# Patient Record
Sex: FEMALE | Race: WHITE | NOT HISPANIC OR LATINO | Employment: UNEMPLOYED | URBAN - METROPOLITAN AREA
[De-identification: names, ages, dates, MRNs, and addresses within clinical notes are randomized per-mention and may not be internally consistent; named-entity substitution may affect disease eponyms.]

---
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
## 2023-01-17 ENCOUNTER — TELEPHONE (OUTPATIENT)
Dept: OBGYN CLINIC | Facility: CLINIC | Age: 18
End: 2023-01-17

## 2023-01-17 NOTE — TELEPHONE ENCOUNTER
Pt's mother, Robert Larkin would like to speak with you re her 21 yr old daughter - Crohns, anemia, menstrual issues

## 2023-03-21 ENCOUNTER — TELEPHONE (OUTPATIENT)
Dept: GASTROENTEROLOGY | Facility: CLINIC | Age: 18
End: 2023-03-21

## 2023-03-21 NOTE — TELEPHONE ENCOUNTER
Left voicemail and requested call back     Informed 6/5/23 Dr Vipul Hong not available at 9:40am but can offer later morning that day

## 2023-03-23 NOTE — TELEPHONE ENCOUNTER
Pt's mom [place] called back to reschedule  Nothing available on 6/5/23   She can be reached at [phone]

## 2023-05-01 ENCOUNTER — TELEPHONE (OUTPATIENT)
Dept: GASTROENTEROLOGY | Facility: CLINIC | Age: 18
End: 2023-05-01

## 2023-05-01 NOTE — TELEPHONE ENCOUNTER
Rec'd a call from Chelsey Ramos at [facility] ( [phone])  She confirmed that the patient is moving care to [place] and is interested in [place]  I requested up to date labs be faxed to the office and informed her that her appointment with Dr Rachel Bentley is on 5/9

## 2023-05-09 ENCOUNTER — TELEPHONE (OUTPATIENT)
Dept: GASTROENTEROLOGY | Facility: CLINIC | Age: 18
End: 2023-05-09

## 2023-05-09 ENCOUNTER — TELEPHONE (OUTPATIENT)
Dept: OTHER | Facility: OTHER | Age: 18
End: 2023-05-09

## 2023-05-09 ENCOUNTER — TELEMEDICINE (OUTPATIENT)
Dept: GASTROENTEROLOGY | Facility: CLINIC | Age: 18
End: 2023-05-09

## 2023-05-09 DIAGNOSIS — R10.9 ABDOMINAL PAIN, UNSPECIFIED ABDOMINAL LOCATION: ICD-10-CM

## 2023-05-09 DIAGNOSIS — R14.0 BLOATING: ICD-10-CM

## 2023-05-09 DIAGNOSIS — K50.012 CROHN'S DISEASE OF SMALL INTESTINE WITH INTESTINAL OBSTRUCTION (HCC): Primary | ICD-10-CM

## 2023-05-09 RX ORDER — BUDESONIDE 3 MG/1
CAPSULE, COATED PELLETS ORAL
COMMUNITY
Start: 2023-04-16

## 2023-05-09 RX ORDER — METHOTREXATE 15 MG/.4ML
INJECTION, SOLUTION SUBCUTANEOUS
COMMUNITY

## 2023-05-09 RX ORDER — OMEGA-3/DHA/EPA/FISH OIL 300-1000MG
CAPSULE ORAL DAILY
COMMUNITY

## 2023-05-09 RX ORDER — PANTOPRAZOLE SODIUM 40 MG/1
TABLET, DELAYED RELEASE ORAL
COMMUNITY
Start: 2023-03-12

## 2023-05-09 RX ORDER — SOD SULF/POT CHLORIDE/MAG SULF 1.479 G
TABLET ORAL
COMMUNITY
Start: 2023-03-08

## 2023-05-09 RX ORDER — PANTOPRAZOLE SODIUM 40 MG/1
40 TABLET, DELAYED RELEASE ORAL DAILY
COMMUNITY
Start: 2023-03-07 | End: 2023-06-05

## 2023-05-09 NOTE — TELEPHONE ENCOUNTER
Called the patient LMOM to call the office back  I left a message  On her answering machine and I left our fax# to my office, because I did not see any medical records scanned into the chart recently

## 2023-05-09 NOTE — TELEPHONE ENCOUNTER
Mom is requesting a call back to verify that office received all outside lab's and imaging prior to 5/9/2023 appointment  Please call back to address

## 2023-05-09 NOTE — PROGRESS NOTES
Virtual Regular Visit    Verification of patient location:    Patient is located at Home in the following state in which I hold an active license [place]      Assessment/Plan:    The patient is a 40-year-old woman with Crohn's disease (onset in 2013 when she presented with abdominal pain and CT with TI involvement and obstruction) multiple episodes of SBO (July 2022 and November 2022) who presents to [facility] care  Notably she was maintained on oral Pentasa for several years doing well until 2019 when she was hospitalized in [place] with partial small bowel obstruction and CT scan with stricture  At that time she had a colonoscopy and was started on subcutaneous weekly methotrexate and her Pentasa was discontinued  She was on methotrexate for the majority of 2020 and then underwent a reevaluation with an MR enterography that showed a very small 3 cm terminal ileal stricture and only mild elevation of her calprotectin  At that time methotrexate was discontinued and she was started on oral Entocort in September 2020  She had a likely flare in August and then another hospitalization in November for partial small bowel obstruction  Most recent colonoscopy reveals mucosal inflammatory changes secondary to Crohn's disease with ileitis but no evidence of stricture noted  There was inflammation with ulceration  Her MR enterography from March 27, 2023 showed a 1 2 cm inflammatory stricture without any upstream dilation  Colonoscopy from March 20, 2023 with fair prep, simple endoscopic score for Crohn's disease 9 with ileitis, 4 mm polyp suggestive of hyperplastic polyp in the sigmoid colon; biopsies of the terminal ileum normal, random colon biopsies normal, colon polyp with fecal material     Most recent calprotectin 153  CMP normal   Ferritin 281 with iron of 54 and iron saturation of 19  CRP 3 7  Normal white blood cell count, hemoglobin, MCV, platelets    Prior hepatitis B surface antibody reactive and surface antigen nonreactive  Prior vitamin D 36 7  At this time given her recurrent partial small bowel obstructions and active disease seen on imaging we will proceed with a biologic for moderately active Crohn's disease of the small bowel  Problem List Items Addressed This Visit        Other    Crohn's disease ([address] ) - Primary    Relevant Medications    budesonide (ENTOCORT EC) 3 MG capsule    Methotrexate, PF, (Otrexup) 15 MG/0 4ML SOAJ   Other Visit Diagnoses     Bloating        Abdominal pain, unspecified abdominal location              We discussed the risks and benefits of different treatment options including adalimumab versus Stelara versus Skyrizi  We will proceed with Stelara  Next blood work and calprotectin TBD  Next colonoscopy TBD  Next MR enterography potentially in 9 to 12 months  No indication for surveillance colonoscopies given absence of colonic Crohn's disease    IBD HCM when on biologic:  Avoid live virus vaccines  Yearly flu shot  COVID vaccine and booster  Pneumonia vaccine  Shingrix  Routine Pap smears as per national guidelines  Routine skin exams with the dermatologist  DEXGAVIN in the future         Reason for visit is   Chief Complaint   Patient presents with   • Virtual Regular Visit   • Virtual Regular Visit        Encounter provider Alaina Jara MD    Provider located at [address] [place] [id number] [phone]      Recent Visits  No visits were found meeting these conditions  Showing recent visits within past 7 days and meeting all other requirements  Today's Visits  Date Type Provider Dept   05/09/23 Telephone Alaina Jara MD [facility]   05/09/23 Telemedicine Alaina Jara MD [facility]   Showing today's visits and meeting all other requirements  Future Appointments  No visits were found meeting these conditions    Showing future appointments within next 150 days and meeting all other requirements       The patient was identified by name and date of birth  Brittaney Rivera was informed that this is a telemedicine visit and that the visit is being conducted through the [employer]  She agrees to proceed     My office door was closed  No one else was in the room  She acknowledged consent and understanding of privacy and security of the video platform  The patient has agreed to participate and understands they can discontinue the visit at any time  Patient is aware this is a billable service  Referred by Dr Sheryle Manger and Dr Kenyetta Leiva for Crohn's    Subjective  Brittaney Rivera is a 21 y o  female with Crohns diagnosed at age 3  She was on pentasa for 5 years  Milk was a trigger  She went to [place] and had a pSBO  She was on budesonide to calm things and she did Methotrexate but it did nto work well  After that she was sensitive to gluten and some other foods  It made her body sensitive  She has been managing things with diet  She avoids certain foods to manage it that way  1 episode in [place] and she was in the hospital  She was doing 80:20 liquid diet  August she was in 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  She has been on a budesonide taper  Now on 3mg  She saw a GI physician there and had MRE, colonoscopy and blood work  In the past 2 weeks normal stools  She will be more constipated than not  Today she feels bloated  Some days like that  She might notice more distention and she will drink more liquids  1 BMs per day, + formed  No bright red blood per rectum/rectal bleeding, no melena  If bloated and constipated she will feel pain  No rashes, no mouth sores, no joint pains  + tiredness  She had iron infusions last summer     She does have occasional feeling of fullness  She was first diagnosed with gastroparesis  In 2-3 weeks she had to take a Tums twice  No heartburn, dysphagia, odynophagia  Occasional mild nausea but no vomiting  When the Crohn's is active she will have nausea  Over the Winter when on the budesonide she could not sleep  She has been on pantoprazole  Sometimes she is hungry but feels like she can not eat  No recent weight loss (but she lost 10 lbs in the Winter)  She had irregular periods but it got better  Her feet are cold  They saw endocrinology, vascular  Past Medical History:   Diagnosis Date   • Abnormal weight loss     last assessed: 1/15/15   • Anemia     resolved: 12/10/15   • Asthma    • Calcaneal apophysitis     left; lasts assessed: 2/17/14   • [facility] disease [facility] )    • Mass of breast, left     last assessed: 6/9/15   • Unspecified subluxation of left patella, initial encounter     last assessed: 2/2/16   • Vitamin D deficiency        Past Surgical History:   Procedure Laterality Date   • COLONOSCOPY     • ESOPHAGOGASTRODUODENOSCOPY     • PORT WINE STAIN REMOVAL W/ LASER         Current Outpatient Medications   Medication Sig Dispense Refill   • pantoprazole (PROTONIX) 40 mg tablet Take 40 mg by mouth daily     • ascorbic acid (VITAMIN C) 250 mg tablet Take 500 mg by mouth daily     • b complex vitamins capsule Take 1 capsule by mouth daily     • budesonide (ENTOCORT EC) 3 MG capsule TAKE 2 CAPSULES (6 MG TOTAL) BY MOUTH 1 (ONE) TIME EACH DAY IN THE MORNING       • Cholecalciferol (VITAMIN D3) 2000 units capsule Take 1 capsule by mouth daily Take 3000 iu daily      • DIGESTIVE ENZYMES PO Take by mouth     • fish oil-omega-3 fatty acids 1000 MG capsule Take by mouth daily     • Methotrexate, PF, (Otrexup) 15 MG/0 4ML SOAJ INJECT 1 PEN UNDER THE SKIN ONCE A WEEK (SATURDAY) for 28     • Omega-3 Fatty Acids (Fish Oil) 1000 MG CPDR Take by mouth     • ondansetron (ZOFRAN-ODT) 4 mg disintegrating tablet Take 1 tablet (4 mg total) by mouth every 6 (six) hours as needed for nausea or vomiting 20 tablet 0   • pantoprazole (PROTONIX) 40 mg tablet TAKE 1 TABLET BY MOUTH EVERY DAY **DO NOT CRUSH, CHEW, OR SPLIT**     • Probiotic Product (Misc Intestinal Aga Regulat) CAPS Take by mouth daily     • Probiotic Product (PROBIOTIC-10 PO) Take by mouth     • Sutab 1385-147-657 MG TABS TAKE 24 TABLETS BY MOUTH SEE ADMINISTRATION INSTRUCTIONS  No current facility-administered medications for this visit  Allergies   Allergen Reactions   • Gluten Meal - Food Allergy    • Seasonal Ic [Cholestatin]    • Tilactase        REVIEW OF SYSTEMS:  10 point ROS reviewed and negative, except as above        PHYSICAL EXAMINATION:  Appearance and vitals taken from home devices    General Appearance:   Alert, cooperative, no distress   HEENT:  Normocephalic, atraumatic, anicteric  Neck supple, symmetrical, trachea midline  Lungs:   Equal chest rise and unlabored breathing, normal effort, no coughing  Cardiovascular:   No visualized JVD  Abdomen:   No abdominal distension  Skin:   No jaundice, rashes, or lesions  Musculoskeletal:   Normal range of motion visualized  Psych:  Normal affect and normal insight  Neuro:  Alert and appropriate           Visit Time  Total Visit Duration: 40

## 2023-05-10 NOTE — PATIENT INSTRUCTIONS
I called patient to let her know that I put a recall in Epic for a 3-4 month follow up in Epic,for the nayla office,  and I let her know I will be sending her sent patient AVS in the mail   She expressed understanding

## 2023-05-11 NOTE — TELEPHONE ENCOUNTER
5/11 called [phone] prior auth started, [id number], clinicals faxed to [phone]

## 2023-05-12 NOTE — TELEPHONE ENCOUNTER
Pts mom is calling to speak with Junior Griffin regarding [employer]  She would like to get this medication started as soon as possible  Informed her that prior authorization was started  She was appreciative but would still like to speak with Rosalba on Monday

## 2023-05-15 NOTE — TELEPHONE ENCOUNTER
Connected with [place] via phone  We spoke of the prior authorization process and Skyrizi  Along with an approximate timeline of PA determination and infusion appointment  Tony Le wanted to know if she should call insurance to help the process  I assured her that our Authorization Specialist will take care of this and reach out once a determination is made

## 2023-05-18 NOTE — TELEPHONE ENCOUNTER
Called Nikhil at [phone]  Reviewed dose is 600mg IV monthly for 3 doses  She approved one dose automatically for us  The remaining two doses will be sent to medical director under a new case and he may deny for site of care  Jf Richardson will call me back with that new pending case number        1 dose approved  5/12/2023 through 6/12/2023  Auth #: [id number]

## 2023-05-18 NOTE — TELEPHONE ENCOUNTER
Called insurance at [phone] and spoke with Daniel Gaston  She stated this is pending and will be going to medical director  She stated it will most likely get denied but we can than move forward with a peer to peer if we would like

## 2023-05-18 NOTE — TELEPHONE ENCOUNTER
Patients GI provider:  Dr Kumar Lopez    Number to return call: [phone]    Reason for call: Liya Rowan from [facility] calling wanting the dosage and frequency of the medication Loc Rowan may be reached at the above number      Scheduled procedure/appointment date if applicable: N/A

## 2023-05-31 ENCOUNTER — TELEPHONE (OUTPATIENT)
Dept: GASTROENTEROLOGY | Facility: CLINIC | Age: 18
End: 2023-05-31

## 2023-05-31 NOTE — TELEPHONE ENCOUNTER
Brook Mancilla called in to inform that secondary insurance has [facility] but primary insurance has approved and is requesting a peer to peer  [employer] Ph# [phone]  Case # [id number]    Must advise that it's a medical buy & build  Brook Mancilla is requesting a return phone call once the office has spoken with the insurance company

## 2023-06-01 NOTE — TELEPHONE ENCOUNTER
Connected with the mother via phone  Mother expressed frustration with insurance company and inconsistent information  I relayed the recent communications within this thread to her  She is pleased that it is approved through secondary and we are waiting for [facility] to get documentation of the approval so they can schedule Harry  The mother expressed concern about not having the Skyrizi SQ approved before starting the IV loading  I explained that [place] will have a higher likelihood of approval with the SQ portion of Skyrizi if she is successful with the IV portion and traditionally we will wait to get the SQ portion authorized for this reason

## 2023-06-01 NOTE — TELEPHONE ENCOUNTER
6/1 called number listed, after 3 transfers I was told that it is approved but Al Garcia is working on it so I cant get the info yet  She will fax the auth to us   I called [facility] and spoke to [facility] and she will work on getting pt scheduled ASAP

## 2023-06-01 NOTE — TELEPHONE ENCOUNTER
Sherron ozuna from [employer] in regards to [employer]  She states urgent request for Yousuf Breaux has been approved and that it does not need to go through an appeal process  Sherron Alex can be reached at [phone] regarding this matter

## 2023-06-02 NOTE — TELEPHONE ENCOUNTER
6/2 spoke with Hyacinth Chavez [phone], she still hasnt gotten the approval fax from [address] [place] [address] yet, but they will not hold up treatment, they will be calling pt to schedule infusions  I sent pt a "[employer] message telling her they will be calling to schedule, also gave her 2 numbers to schedule herself  [phone] or [phone]

## 2023-06-02 NOTE — TELEPHONE ENCOUNTER
6/2 spoke to [facility] at [facility], she hasnt gotten anything confirming [facility] has approved the skyrizi, she is going to call them to verify for herself and call me back   [phone]

## 2023-06-09 ENCOUNTER — TELEPHONE (OUTPATIENT)
Dept: OTHER | Facility: OTHER | Age: 18
End: 2023-06-09

## 2023-06-09 NOTE — TELEPHONE ENCOUNTER
Connected with the mother via phone  She has not received communication from [facility] about setting up the patient's infusions  I said that I will call my contact there and give her a call right back

## 2023-06-09 NOTE — TELEPHONE ENCOUNTER
Connected with the mother via phone and relayed my communication with [facility]  She expressed her displeasure with this entire process with [facility] and how the insurance company is forcing [place] outside the hospital  She said that she has left [employer] and another employee at [facility] a message and didn't hear back from them  She also called nursing at [facility] 2 weeks ago with questions about their protocols and hasn't heard back  She is concerned with a reaction and how the nurses are trained  She said that if she needs to have [place] come to  to have a peripheral line placed temporarily then she will and then [facility] can access it  The mother added that if they don't give her the right answers then she may not get the infusion at [facility]

## 2023-06-09 NOTE — TELEPHONE ENCOUNTER
Per Vilma from [facility]: We received approval at 10:39 this morning from [facility]  We have been having trouble with her secondary insurance  I will call and have this expedited  I explained that the mother would like a call back immediately as she has been calling and hasn't received a phone call back

## 2023-06-09 NOTE — TELEPHONE ENCOUNTER
Patient's mother would appreciate a callback from [facility] in [employer] office

## 2023-06-09 NOTE — TELEPHONE ENCOUNTER
6/9 still awaiting approval from [facility], when I spoke to Dandre Bledsoe on 6/5 when she called asking if I got the approval letter, she stated there were documented messages in pts chart that they had reached out to the patient to schedule

## 2023-06-23 ENCOUNTER — TELEPHONE (OUTPATIENT)
Dept: GASTROENTEROLOGY | Facility: CLINIC | Age: 18
End: 2023-06-23

## 2023-06-26 ENCOUNTER — TELEPHONE (OUTPATIENT)
Dept: FAMILY MEDICINE CLINIC | Facility: MEDICAL CENTER | Age: 18
End: 2023-06-26

## 2023-06-26 DIAGNOSIS — M25.561 RIGHT KNEE PAIN, UNSPECIFIED CHRONICITY: Primary | ICD-10-CM

## 2023-06-26 NOTE — TELEPHONE ENCOUNTER
Spoke with patient, mom is not listed on the most recent communication form  Patient was kicked in the right knee by a horse back in December  This happened in [place], imagining did not show any fractures  She would like a referral for PT for R knee

## 2023-07-06 NOTE — PROGRESS NOTES
PT Evaluation     Today's date: 7/10/2023  Patient name: Grayland Denver  : 2005  MRN: [id number]  Referring provider: Self, Referral  Dx:   Encounter Diagnosis     ICD-10-CM    1. Chronic pain of right knee  M25.561     G89.29       2. Right hip pain  M25.551       3. Chronic midline low back pain without sciatica  M54.50     G89.29           Start Time: 0900  Stop Time: 0945  Total time in clinic (min): 45 minutes    Assessment  Assessment details: Grayland Denver is a 21 y.o. female who presents with signs and symptoms consistent of waxing/waning lower back pain, R hip pain, and R lateral knee pain. 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 Patient presents with centralized lower back pain, right lateral hip pain, and right lateral knee pain. She demonstrates decreased L/S joint mobility, R hip joint restrictions, LE flexibility restrictions, weakness in lower abdominal region, and decreased motor control of hips. Due to these impairments, Patient has difficulty performing daily jobs duties and riding without discomfort. Patient would benefit from skilled physical therapy to address the impairments, improve their level of function, and to improve their overall quality of life.     Primary movement impairments:   1) Lower L/S joint mobility restrictions  2) Poor motor control of hip and lower abdominal region  3) R hip joint mobility restrictions  4) Flexibility restrictions in gastroc/soleus, hamstrings, and R quadriceps     Impairments: abnormal muscle firing, abnormal muscle tone, abnormal or restricted ROM, activity intolerance, impaired physical strength, lacks appropriate home exercise program and pain with function  Understanding of Dx/Px/POC: good   Prognosis: good    Goals  Short Term Goals: to be achieved by 4 weeks  1) Patient to be independent with basic HEP.  2) Decrease pain to mild at its worst.  3) Increase joint mobility in L/S to WNL   4) Increase (hip) LE strength by 1/2 MMT grade in all deficient planes. Long Term Goals: to be achieved by discharge  1) FOTO equal to or greater than expected. 2) Pt to be able to ride horses with manageable discomfort   3) Improve SL squat mechanics to symmetrical without compensated trendelenburg  4) Pt to demonstrate negative DEVEN/FADIR tests of the R hip   4) Pt to improved hamstring flexibility by 5-10 degrees bilaterally. Plan  Patient would benefit from: PT eval and skilled physical therapy  Planned therapy interventions: joint mobilization, manual therapy, patient education, neuromuscular re-education, therapeutic activities, therapeutic exercise and home exercise program  Frequency: 1x per week for 6-8 weeks. Treatment plan discussed with: patient        Subjective Evaluation    History of Present Illness  Mechanism of injury: History of Current Injury: Pt referred to PT due to R knee pain after receiving a kick to the knee in December from a horse she was working with in [place]. Pt also complains of pain at her right hip and lumbar spine. Pt reports most discomfort right now is her lower back. Pt denies any inciting injury but was treated for this condition by me last year. More recently, lower back has been aching more. 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 Pt has been very busy with working the barn. She doesn't have any days off. Pt has a medical history of Crohn's disease. She just started with [employer]. Pain location/Descriptors: P1: centralized LBP (ache). P2: Sharp lateral right hip pain. P3: lateral right knee pain. Aggravating factors: More symptoms when off her horse and after a long day. Often increases with excessive riding. May increase with jerky movements when riding her horses. Easing factors: Massage therapist (Every 2 weeks).   24 HR pattern: Depending on activity level. Imaging: XR of knee- No acute pathology (December in 2022) while in [place]. Special Questions: Pt denies any N&T, Denies B&B abnormalities, Saddle anesthesia. Pt denies any buckling or giving way of her right knee. 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 Pain scale at highest: Moderate. Diagnostic Tests  X-ray: normal  Treatments  Previous treatment: chiropractic, massage and physical therapy  Current treatment: chiropractic and massage  Patient Goals  Patient goals for therapy: increased strength, increased motion, decreased pain and independence with ADLs/IADLs          Objective     Active Range of Motion     Lumbar   Flexion: 110 degrees   Extension: 35 degrees  with pain  Left lateral flexion: 30 degrees    with pain  Right lateral flexion: 40 degrees   Left rotation:  Restriction level: moderate  Right rotation:  Restriction level: moderate    Additional Active Range of Motion Details  L quadrant: pain on opp  R quadrant: pain on opp   Side glide: Pinching discomfort bilaterally   *Pain with end range extension  *Pain with R SB     Joint Play   Joints within functional limits: L1, L2 and L3     Hypomobile: L4, L5 and S1     Strength/Myotome Testing     Lumbar   Left   Heel walk: normal  Toe walk: normal    Right   Heel walk: normal  Toe walk: normal    Left Hip   Planes of Motion   Flexion: 4-  Extension: 4-  Abduction: 3+  External rotation: 4-    Right Hip   Planes of Motion   Flexion: 4-  Extension: 4-  Abduction: 3+  External rotation: 4-    Left Knee   Flexion: 5  Extension: 5    Right Knee   Flexion: 5  Extension: 5    Left Ankle/Foot   Dorsiflexion: 5  Plantar flexion: 5  Great toe extension: 5    Right Ankle/Foot   Dorsiflexion: 5  Plantar flexion: 5  Great toe extension: 5    Tests     Lumbar     Left   Positive quadrant.    Negative crossed SLR and passive SLR. Right   Positive quadrant. Negative crossed SLR and passive SLR. Left Pelvic Girdle/Sacrum   Negative: thigh thrust.     Left Hip   Negative DEVEN and FADIR. Right Hip   Positive DEVEN and FADIR.      Additional Tests Details  *Difficulty activating lower abdominal region  Functional squat: Shift to the R   SL squat: compensated trendelenburg more  On L vs R. Valgus bilaterally     Hamstring length:   R:75 deg (Max)  L: 80 deg (Max)     Mild restriction in R Quad compared to L   Mod restriction in Gastroc/soleus    +DEVEN/FADIR on R for hip pain  Hip ER @ 90: 75 deg R and 90 L    *Hypomobile CPA at lower L/S worse L than R             Precautions: Vitamin D deficiency, Crohn's Disease      Manuals             CPA of lower lumbar spine nv                                                   Neuro Re-Ed             Sidelying plank with hip abduction             6 " holds in neutral             SLB with PB hip rotations             90/90 hooklying with hip flexor activation                                                    Ther Ex             Ellipitcal              Hip IR/ER in long sitting             Hamstring stretch on step                                                                               Ther Activity             Kneeling press c/ hip rotations             Prone plank with hip extension             Gait Training                                       Modalities                                         Pt was given a HEP with verbal and written instruction: ham stretch, calf stretch, side plank c/ hip abd, WB ER stretch

## 2023-07-10 ENCOUNTER — OFFICE VISIT (OUTPATIENT)
Dept: PHYSICAL THERAPY | Facility: CLINIC | Age: 18
End: 2023-07-10
Payer: COMMERCIAL

## 2023-07-10 DIAGNOSIS — M25.551 RIGHT HIP PAIN: ICD-10-CM

## 2023-07-10 DIAGNOSIS — M25.561 CHRONIC PAIN OF RIGHT KNEE: Primary | ICD-10-CM

## 2023-07-10 DIAGNOSIS — M54.50 CHRONIC MIDLINE LOW BACK PAIN WITHOUT SCIATICA: ICD-10-CM

## 2023-07-10 DIAGNOSIS — G89.29 CHRONIC MIDLINE LOW BACK PAIN WITHOUT SCIATICA: ICD-10-CM

## 2023-07-10 DIAGNOSIS — G89.29 CHRONIC PAIN OF RIGHT KNEE: Primary | ICD-10-CM

## 2023-07-10 PROCEDURE — 97162 PT EVAL MOD COMPLEX 30 MIN: CPT | Performed by: PHYSICAL THERAPIST

## 2023-07-10 PROCEDURE — 97110 THERAPEUTIC EXERCISES: CPT | Performed by: PHYSICAL THERAPIST

## 2023-07-11 DIAGNOSIS — R11.0 NAUSEA: ICD-10-CM

## 2023-07-11 RX ORDER — ONDANSETRON 4 MG/1
4 TABLET, ORALLY DISINTEGRATING ORAL EVERY 6 HOURS PRN
Qty: 20 TABLET | Refills: 0 | Status: SHIPPED | OUTPATIENT
Start: 2023-07-11

## 2023-07-17 ENCOUNTER — TELEPHONE (OUTPATIENT)
Dept: FAMILY MEDICINE CLINIC | Facility: MEDICAL CENTER | Age: 18
End: 2023-07-17

## 2023-07-17 ENCOUNTER — NURSE TRIAGE (OUTPATIENT)
Age: 18
End: 2023-07-17

## 2023-07-17 ENCOUNTER — APPOINTMENT (OUTPATIENT)
Dept: PHYSICAL THERAPY | Facility: CLINIC | Age: 18
End: 2023-07-17
Payer: COMMERCIAL

## 2023-07-17 DIAGNOSIS — K50.012 CROHN'S DISEASE OF SMALL INTESTINE WITH INTESTINAL OBSTRUCTION (HCC): Primary | ICD-10-CM

## 2023-07-17 DIAGNOSIS — J02.9 SORE THROAT: ICD-10-CM

## 2023-07-17 RX ORDER — DIPHENHYDRAMINE HYDROCHLORIDE AND LIDOCAINE HYDROCHLORIDE AND ALUMINUM HYDROXIDE AND MAGNESIUM HYDRO
10 KIT EVERY 4 HOURS PRN
Qty: 119 ML | Refills: 0 | Status: SHIPPED | OUTPATIENT
Start: 2023-07-17

## 2023-07-17 NOTE — TELEPHONE ENCOUNTER
----- Message from Ever Rankin [facility]) [employer] sent at 7/17/2023  4:47 PM EDT -----  Pt stated that she spoke with Rosalba this morning, and she's waiting for a call back, but she did not receive a call back as yet.  7.17.23

## 2023-07-17 NOTE — TELEPHONE ENCOUNTER
I called and spoke to [place]. Visit with PCP scheduled for tomorrow. No fever. Sore throat, congestion. 1 cough earlier today. Recommended to see PCP for rapid strep test and culture, as well as additional work-up. Sent Magic Mouthwash (and will do it pro-actively).

## 2023-07-17 NOTE — TELEPHONE ENCOUNTER
Pt's mother called. Pt had her 2nd skyrizzi injection a week ago. On the 12th, pt started with a sore throat, no fever, congestion. She has been using decongestant and flonase. Mother called gi and is waiting for a call back. Sore throat is not necessarily a reaction to skyrizzi but cold symptoms are. Pt is immunosuppressed. Please, advise.

## 2023-07-17 NOTE — TELEPHONE ENCOUNTER
Patients mother Sulema Nicole calling in to speak with Rosalba.  Please call Sulema Nicole at [phone]

## 2023-07-17 NOTE — TELEPHONE ENCOUNTER
Added onto the encounter on 7/17 at 11:05am:    Crow Doug complained of waiting on hold for 45+ minutes. I provided alternate means to communicate with the office such as [employer] messaging. Crow Doug mentions she doesn't know who she is going to speak to [facility], [facility], or the state about Harry's infusion experience. She expressed fault in infusion center's ([facility]) lack of organization, lack of cleanliness, lack of communication, and adds that if she wouldn't have called then [place] wouldn't have gotten her medication.     Crow Doug states, "I'm glad I'm educated because no one knows how to do their job."

## 2023-07-17 NOTE — TELEPHONE ENCOUNTER
Connected with the patient's mother Majo via phone. Patient had second Skyrizi infusion on 7/11. Patient started with cold-like symptoms on 7/12 and currently is experiencing the following:  -Sore throat  -Congestion  -Loosing voice  -Fatigue, mother is unsure if it's the patient's normal fatigue. Denies: fever, mouth sores  Patient has been taking Motrin, Claritin, Vitamin D, and Vitamin C. I encouraged Majo to notify the patient's PCP. Majo stated "they are going to want her to go the ER and I will not take her to the ER." I then suggested to set up a televisit through urgent care, since the PCP wouldn't be an option for the patient. I went on to say that they would be able to address cold-like symptoms to determine if they are viral or an infection. Majo then said, "I will call the PCP I think they could fit her in."     Livingston read the [employer] package insert and said that it didn't mention anything about a sore throat. I referenced that this medication is immunocompromising in nature therefore [facility] get sick more often than someone who isn't immunocompromised. Livingston mentioned if blood work is needed as [facility] usually has blood work every [place]. I said that I will ask Dr. Georgia Esposito. Dr. Georgia Esposito: Would you please advise. Thank you!

## 2023-07-18 ENCOUNTER — OFFICE VISIT (OUTPATIENT)
Dept: FAMILY MEDICINE CLINIC | Facility: MEDICAL CENTER | Age: 18
End: 2023-07-18
Payer: COMMERCIAL

## 2023-07-18 ENCOUNTER — TELEPHONE (OUTPATIENT)
Dept: FAMILY MEDICINE CLINIC | Facility: MEDICAL CENTER | Age: 18
End: 2023-07-18

## 2023-07-18 ENCOUNTER — APPOINTMENT (OUTPATIENT)
Dept: PHYSICAL THERAPY | Facility: CLINIC | Age: 18
End: 2023-07-18
Payer: COMMERCIAL

## 2023-07-18 VITALS
TEMPERATURE: 98 F | HEART RATE: 78 BPM | WEIGHT: 115.6 LBS | OXYGEN SATURATION: 99 % | BODY MASS INDEX: 18.58 KG/M2 | HEIGHT: 66 IN

## 2023-07-18 DIAGNOSIS — J02.9 SORE THROAT: Primary | ICD-10-CM

## 2023-07-18 DIAGNOSIS — R09.81 SINUS CONGESTION: ICD-10-CM

## 2023-07-18 LAB — S PYO AG THROAT QL: NEGATIVE

## 2023-07-18 PROCEDURE — 87636 SARSCOV2 & INF A&B AMP PRB: CPT | Performed by: STUDENT IN AN ORGANIZED HEALTH CARE EDUCATION/TRAINING PROGRAM

## 2023-07-18 PROCEDURE — 87070 CULTURE OTHR SPECIMN AEROBIC: CPT | Performed by: STUDENT IN AN ORGANIZED HEALTH CARE EDUCATION/TRAINING PROGRAM

## 2023-07-18 PROCEDURE — 99213 OFFICE O/P EST LOW 20 MIN: CPT | Performed by: STUDENT IN AN ORGANIZED HEALTH CARE EDUCATION/TRAINING PROGRAM

## 2023-07-18 PROCEDURE — 87880 STREP A ASSAY W/OPTIC: CPT | Performed by: STUDENT IN AN ORGANIZED HEALTH CARE EDUCATION/TRAINING PROGRAM

## 2023-07-18 NOTE — PROGRESS NOTES
[address]  [facility] Note  Holli Delcid [place], 21     Jam Saleh MRN: [id number] : 2005 Age: 21 y.o. Assessment/Plan     1. Sore throat    -Afebrile, vital signs stable  -Likely viral in etiology  -Continue supportive care measures  -Start Flonase  - POCT rapid strepA negative  - Throat culture; Future  - Covid/Flu- Office Collect  - Throat culture  -We will be in touch with throat culture results and COVID-19/influenza testing  -Patient advised about signs and symptoms in which case to contact sooner such as fever    2. Sinus congestion    - Covid/Flu- Office Collect      Jam Saleh acknowledged understanding of treatment plan, all questions answered. Subjective     Jam Saleh is a 21 y.o. female who presents for evaluation of sore throat. Patient states she started [place], had first infusion  and second infusion . Symptoms as far sore throat started . On , , symptoms worsened " super congested and throat worse, no fever, started to lose my voice". Today, " pressure in my head not like it was" and hoarseness, post nasal drip, sinus and nasal congestion and sore throat. Patient has been managing her symptoms with Magic mouthwash as prescribed by her GI and supportive care measures such as tea with honey. She is drinking plenty of fluids. She has not had a recent close exposure to someone with proven streptococcal pharyngitis.     The following portions of the patient's history were reviewed and updated as appropriate: allergies, current medications, past family history, past medical history, past social history, past surgical history and problem list.     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   • Crohn's disease ([address])    • Mass of breast, left     last assessed: 6/9/15   • Unspecified subluxation of left patella, initial encounter     last assessed: 2/2/16   • Vitamin D deficiency        Allergies   Allergen Reactions   • Gluten Meal - Food Allergy    • Seasonal Ic [Cholestatin]    • Tilactase        Past Surgical History:   Procedure Laterality Date   • COLONOSCOPY     • ESOPHAGOGASTRODUODENOSCOPY     • PORT WINE STAIN REMOVAL W/ LASER         Family History   Problem Relation Age of Onset   • Hypothyroidism Mother    • No Known Problems Father    • Cancer Family    • ROJELIO disease Family    • Allergies Family    • Hypothyroidism Maternal Grandmother        Social History     Socioeconomic History   • Marital status: Single     Spouse name: None   • Number of children: None   • Years of education: None   • Highest education level: None   Occupational History   • None   Tobacco Use   • Smoking status: Never   • Smokeless tobacco: Never   Substance and Sexual Activity   • Alcohol use: No   • Drug use: No   • Sexual activity: None   Other Topics Concern   • None   Social History Narrative    Lives with parents     Social Determinants of Health     Financial Resource Strain: Not on file   Food Insecurity: Not on file   Transportation Needs: Not on file   Physical Activity: Not on file   Stress: Not on file   Social Connections: Not on file   Intimate Partner Violence: Not on file   Housing Stability: Not on file       Current Outpatient Medications   Medication Sig Dispense Refill   • ascorbic acid (VITAMIN C) 250 mg tablet Take 500 mg by mouth daily     • Cholecalciferol (VITAMIN D3) 2000 units capsule Take 1 capsule by mouth daily Take 3000 iu daily      • diphenhydramine, lidocaine, Al/Mg hydroxide, simethicone (Magic Mouthwash) SUSP Swish and swallow 10 mL every 4 (four) hours as needed for mouth pain or discomfort 119 mL 0   • Risankizumab-rzaa (SKYRIZI IV) Inject into a catheter in a vein     • b complex vitamins capsule Take 1 capsule by mouth daily (Patient not taking: Reported on 7/18/2023)     • budesonide (ENTOCORT EC) 3 MG capsule TAKE 2 CAPSULES (6 MG TOTAL) BY MOUTH 1 (ONE) TIME EACH DAY IN THE MORNING. • DIGESTIVE ENZYMES PO Take by mouth     • fish oil-omega-3 fatty acids 1000 MG capsule Take by mouth daily     • Methotrexate, PF, (Otrexup) 15 MG/0.4ML SOAJ INJECT 1 PEN UNDER THE SKIN ONCE A WEEK (SATURDAY) for 28     • Omega-3 Fatty Acids (Fish Oil) 1000 MG CPDR Take by mouth     • ondansetron (ZOFRAN-ODT) 4 mg disintegrating tablet Take 1 tablet (4 mg total) by mouth every 6 (six) hours as needed for nausea or vomiting 20 tablet 0   • pantoprazole (PROTONIX) 40 mg tablet Take 40 mg by mouth daily     • pantoprazole (PROTONIX) 40 mg tablet TAKE 1 TABLET BY MOUTH EVERY DAY **DO NOT CRUSH, CHEW, OR SPLIT**     • Probiotic Product (Misc Intestinal Aga Regulat) CAPS Take by mouth daily     • Probiotic Product (PROBIOTIC-10 PO) Take by mouth     • Sutab 8327-379-378 MG TABS TAKE 24 TABLETS BY MOUTH SEE ADMINISTRATION INSTRUCTIONS. No current facility-administered medications for this visit. Review of Systems     As noted in HPI    Objective      Pulse 78   Temp 98 °F (36.7 °C)   Ht 5' 6.25" (1.683 m)   Wt 52.4 kg (115 lb 9.6 oz)   LMP 07/09/2023 (Exact Date)   SpO2 99%   BMI 18.52 kg/m²     Physical Exam  Vitals reviewed. Constitutional:       General: She is not in acute distress. Appearance: She is well-developed. She is not toxic-appearing. HENT:      Head: Normocephalic and atraumatic. Right Ear: Tympanic membrane and ear canal normal.      Left Ear: Tympanic membrane and ear canal normal.      Nose: Congestion and rhinorrhea present. Mouth/Throat:      Mouth: Mucous membranes are moist.      Pharynx: Uvula midline. No posterior oropharyngeal erythema. Tonsils: Tonsillar exudate present. No tonsillar abscesses. 1+ on the right. 1+ on the left.    Eyes:      Conjunctiva/sclera: Conjunctivae normal.      Pupils: Pupils are equal, round, and reactive to light. Cardiovascular:      Rate and Rhythm: Normal rate and regular rhythm. Heart sounds: Normal heart sounds. Pulmonary:      Effort: Pulmonary effort is normal. No respiratory distress. Breath sounds: Normal breath sounds. No wheezing. Musculoskeletal:      Cervical back: Neck supple. Lymphadenopathy:      Cervical: No cervical adenopathy. Skin:     General: Skin is warm and dry. Neurological:      Mental Status: She is alert and oriented to person, place, and time. Psychiatric:         Mood and Affect: Mood normal.         Behavior: Behavior normal.             Some portions of this record may have been generated with voice recognition software. There may be translation, syntax, or grammatical errors. Occasional wrong word or "sound-a-like" substitutions may have occurred due to the inherent limitations of the voice recognition software. Read the chart carefully and recognize, using context, where substations may have occurred. If you have any questions, please contact the dictating provider for clarification or correction, as needed.

## 2023-07-18 NOTE — TELEPHONE ENCOUNTER
Please watch for Covid/Flu and Throat Culture Results. Dr Vera Bruno is out for the remainder of the week.

## 2023-07-19 LAB
FLUAV RNA RESP QL NAA+PROBE: NEGATIVE
FLUBV RNA RESP QL NAA+PROBE: NEGATIVE
SARS-COV-2 RNA RESP QL NAA+PROBE: NEGATIVE

## 2023-07-20 LAB — BACTERIA THROAT CULT: NORMAL

## 2023-07-21 ENCOUNTER — OFFICE VISIT (OUTPATIENT)
Dept: PHYSICAL THERAPY | Facility: CLINIC | Age: 18
End: 2023-07-21
Payer: COMMERCIAL

## 2023-07-21 DIAGNOSIS — M25.561 CHRONIC PAIN OF RIGHT KNEE: Primary | ICD-10-CM

## 2023-07-21 DIAGNOSIS — G89.29 CHRONIC PAIN OF RIGHT KNEE: Primary | ICD-10-CM

## 2023-07-21 DIAGNOSIS — M25.551 RIGHT HIP PAIN: ICD-10-CM

## 2023-07-21 PROCEDURE — 97110 THERAPEUTIC EXERCISES: CPT | Performed by: PHYSICAL THERAPIST

## 2023-07-21 PROCEDURE — 97112 NEUROMUSCULAR REEDUCATION: CPT | Performed by: PHYSICAL THERAPIST

## 2023-07-21 PROCEDURE — 97140 MANUAL THERAPY 1/> REGIONS: CPT | Performed by: PHYSICAL THERAPIST

## 2023-07-21 NOTE — PROGRESS NOTES
Daily Note     Today's date: 2023  Patient name: Bernardino Ewing  : 2005  MRN: [id number]  Referring provider: Self, Referral  Dx:   Encounter Diagnosis     ICD-10-CM    1. Chronic pain of right knee  M25.561     G89.29       2. Right hip pain  M25.551           Start Time: 0730  Stop Time: 0815  Total time in clinic (min): 45 minutes    Subjective: Pt reports having massage and accupuncture recently which has helped. Objective: See treatment diary below      Assessment: Treatment focused on impairments identified during initial evaluation (primary movements impairments). Tolerated treatment well. Patient exhibited good technique with therapeutic exercises and would benefit from continued PT. Plan: Continue per plan of care.       Precautions: Vitamin D deficiency, Crohn's Disease      Manuals             CPA of lower lumbar spine Gr III 5'            R Hip caudal glides with ER/IR Gr III 5'            R Hip lateral glide Gr III 4'                         Neuro Re-Ed             Sidelying plank with hip abduction             6 " holds in neutral 24 inches 5" x10 PT feedback  (unable to properly hold 6")            SLB with PB hip rotations             90/90 hooklying with hip flexor activation                                                    Ther Ex             [place] 5'            Hip IR/ER in long sitting 20x            Hamstring stretch on step              Half kneeling- rotation away  15x            Press ups 15x                                                   Ther Activity             Kneeling press c/ hip rotations btb 10x on ea            Prone plank with hip extension             Gait Training                                       Modalities                                       1:1 with PT from [id number]

## 2023-07-24 ENCOUNTER — OFFICE VISIT (OUTPATIENT)
Dept: PHYSICAL THERAPY | Facility: CLINIC | Age: 18
End: 2023-07-24
Payer: COMMERCIAL

## 2023-07-24 DIAGNOSIS — G89.29 CHRONIC MIDLINE LOW BACK PAIN WITHOUT SCIATICA: ICD-10-CM

## 2023-07-24 DIAGNOSIS — G89.29 CHRONIC PAIN OF RIGHT KNEE: Primary | ICD-10-CM

## 2023-07-24 DIAGNOSIS — M54.50 CHRONIC MIDLINE LOW BACK PAIN WITHOUT SCIATICA: ICD-10-CM

## 2023-07-24 DIAGNOSIS — M25.561 CHRONIC PAIN OF RIGHT KNEE: Primary | ICD-10-CM

## 2023-07-24 DIAGNOSIS — M25.551 RIGHT HIP PAIN: ICD-10-CM

## 2023-07-24 PROCEDURE — 97140 MANUAL THERAPY 1/> REGIONS: CPT | Performed by: PHYSICAL THERAPIST

## 2023-07-24 PROCEDURE — 97110 THERAPEUTIC EXERCISES: CPT | Performed by: PHYSICAL THERAPIST

## 2023-07-24 PROCEDURE — 97112 NEUROMUSCULAR REEDUCATION: CPT | Performed by: PHYSICAL THERAPIST

## 2023-07-24 NOTE — PROGRESS NOTES
Daily Note     Today's date: 2023  Patient name: Keron Wesley  : 2005  MRN: [id number]  Referring provider: Self, Referral  Dx:   Encounter Diagnosis     ICD-10-CM    1. Chronic pain of right knee  M25.561     G89.29       2. Right hip pain  M25.551       3. 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 She feels this is somewhat nerve and muscle related. Objective: See treatment diary below      Assessment: Performed theragun to this region to help decrease discomfort with riding today at work. Also mobilized left hip. Discussed the close proximity of hamstring insertion and sciatic nerve. Tolerated treatment well. Continued to progress lumbopelvic stabilization exercises with good tolerance. Patient exhibited good technique with therapeutic exercises and would benefit from continued PT. Plan: Continue per plan of care.       Precautions: Vitamin D deficiency, Crohn's Disease      Manuals            CPA of lower lumbar spine Gr III 5' Gr III 5'           R Hip caudal glides with ER/IR Gr III 5' Gr III +L hip           R Hip lateral glide Gr III 4' Gr III +L hip           Theragun  L hamstring insertion x 3 minutes           Neuro Re-Ed             Sidelying plank with hip abduction             6 " holds in neutral 24 inches 5" x10 PT feedback  (unable to properly hold 6")            SLB with PB hip rotations  10x on ea           90/90 hooklying with hip flexor activation             Prone Alt UE/LE lifts  2x10 on ea                                     Ther Ex             [place] 5' 7'           Hip IR/ER in long sitting 20x            Hamstring stretch on step              Half kneeling- rotation away  15x 5x ea way c/ Lumbar spine SB            Press ups 15x 15x Ther Activity             Kneeling press c/ hip rotations btb 10x on ea blk tb 10x on ea           Prone plank with hip extension             Gait Training                                       Modalities                                       1:1 with PT from 501-545pm

## 2023-07-26 DIAGNOSIS — R10.9 ABDOMINAL PAIN, UNSPECIFIED ABDOMINAL LOCATION: Primary | ICD-10-CM

## 2023-07-26 RX ORDER — PANTOPRAZOLE SODIUM 40 MG/1
40 TABLET, DELAYED RELEASE ORAL
Qty: 30 TABLET | Refills: 2 | Status: SHIPPED | OUTPATIENT
Start: 2023-07-26 | End: 2023-10-24

## 2023-07-28 ENCOUNTER — TELEPHONE (OUTPATIENT)
Age: 18
End: 2023-07-28

## 2023-07-28 NOTE — TELEPHONE ENCOUNTER
Patients GI provider:  Dr. Ignacia Paredes    Number to return call: ([phone]  Reason for call: Pt's mother regarding appt for her daughter and she would like a call back from Shen Samuel.      Scheduled procedure/appointment date if applicable: 91.05.22

## 2023-07-28 NOTE — TELEPHONE ENCOUNTER
Connected with the patient's mother via phone. Mother wanted to make sure that 10/25 appointment is okay. She would like to know if [place] needs blood work prior to that appointment. Please advise.

## 2023-07-31 ENCOUNTER — APPOINTMENT (OUTPATIENT)
Dept: PHYSICAL THERAPY | Facility: CLINIC | Age: 18
End: 2023-07-31
Payer: COMMERCIAL

## 2023-08-01 ENCOUNTER — OFFICE VISIT (OUTPATIENT)
Dept: PHYSICAL THERAPY | Facility: CLINIC | Age: 18
End: 2023-08-01
Payer: COMMERCIAL

## 2023-08-01 DIAGNOSIS — M25.561 CHRONIC PAIN OF RIGHT KNEE: Primary | ICD-10-CM

## 2023-08-01 DIAGNOSIS — G89.29 CHRONIC PAIN OF RIGHT KNEE: Primary | ICD-10-CM

## 2023-08-01 DIAGNOSIS — M25.551 RIGHT HIP PAIN: ICD-10-CM

## 2023-08-01 DIAGNOSIS — G89.29 CHRONIC MIDLINE LOW BACK PAIN WITHOUT SCIATICA: ICD-10-CM

## 2023-08-01 DIAGNOSIS — M54.50 CHRONIC MIDLINE LOW BACK PAIN WITHOUT SCIATICA: ICD-10-CM

## 2023-08-01 PROCEDURE — 97110 THERAPEUTIC EXERCISES: CPT | Performed by: PHYSICAL THERAPIST

## 2023-08-01 PROCEDURE — 97140 MANUAL THERAPY 1/> REGIONS: CPT | Performed by: PHYSICAL THERAPIST

## 2023-08-01 PROCEDURE — 97112 NEUROMUSCULAR REEDUCATION: CPT | Performed by: PHYSICAL THERAPIST

## 2023-08-01 NOTE — PROGRESS NOTES
Daily Note     Today's date: 2023  Patient name: Anette Calles  : 2005  MRN: [id number]  Referring provider: Self, Referral  Dx:   Encounter Diagnosis     ICD-10-CM    1. Chronic pain of right knee  M25.561     G89.29       2. Right hip pain  M25.551       3. Chronic midline low back pain without sciatica  M54.50     G89.29                      Subjective: Pt rode 7 horses throughout the day today and can really feel stiff and fatigue. Objective: See treatment diary below      Assessment: Tolerated treatment well. Patient reports good progress and relief after last session. Pt continues to be very challenged with lower abdominal strengthening exercises. Patient exhibited good technique with therapeutic exercises and would benefit from continued PT. Plan: Continue per plan of care.       Precautions: Vitamin D deficiency, Crohn's Disease      Manuals           CPA of lower lumbar spine Gr III 5' Gr III 5' Gr III 5'          R Hip caudal glides with ER/IR Gr III 5' Gr III +L hip Gr III +L hip          R Hip lateral glide Gr III 4' Gr III +L hip Gr III +L hip          Theragun  L hamstring insertion x 3 minutes           Neuro Re-Ed             Sidelying plank with hip abduction             6 " holds in neutral 24 inches 5" x10 PT feedback  (unable to properly hold 6")  24 inches 5" x10 PT feedback  (unable to properly hold 6")          SLB with PB hip rotations  10x on ea 20x ea          90/90 hooklying with hip flexor activation             Prone Alt UE/LE lifts  2x10 on ea 2x10 on ea                                    Ther Ex             [place] 5' 7' 7'          Hip IR/ER in long sitting 20x  20x          Hamstring stretch on step              Half kneeling- rotation away  15x 5x ea way c/ Lumbar spine SB            Press ups 15x 15x 3x10                                                 Ther Activity             Kneeling press c/ hip rotations btb 10x on ea blk tb 10x on ea Prone plank with hip extension             Gait Training                                       Modalities                                       1:1 with PT from 3-338pm

## 2023-08-11 ENCOUNTER — APPOINTMENT (OUTPATIENT)
Dept: PHYSICAL THERAPY | Facility: CLINIC | Age: 18
End: 2023-08-11
Payer: COMMERCIAL

## 2023-08-14 ENCOUNTER — EVALUATION (OUTPATIENT)
Dept: PHYSICAL THERAPY | Facility: CLINIC | Age: 18
End: 2023-08-14
Payer: COMMERCIAL

## 2023-08-14 DIAGNOSIS — M25.561 CHRONIC PAIN OF RIGHT KNEE: Primary | ICD-10-CM

## 2023-08-14 DIAGNOSIS — G89.29 CHRONIC MIDLINE LOW BACK PAIN WITHOUT SCIATICA: ICD-10-CM

## 2023-08-14 DIAGNOSIS — M54.50 CHRONIC MIDLINE LOW BACK PAIN WITHOUT SCIATICA: ICD-10-CM

## 2023-08-14 DIAGNOSIS — G89.29 CHRONIC PAIN OF RIGHT KNEE: Primary | ICD-10-CM

## 2023-08-14 DIAGNOSIS — M25.551 RIGHT HIP PAIN: ICD-10-CM

## 2023-08-14 PROCEDURE — 97110 THERAPEUTIC EXERCISES: CPT | Performed by: PHYSICAL THERAPIST

## 2023-08-14 PROCEDURE — 97140 MANUAL THERAPY 1/> REGIONS: CPT | Performed by: PHYSICAL THERAPIST

## 2023-08-14 NOTE — PROGRESS NOTES
Daily Note     Today's date: 2023  Patient name: Emigdio Lin  : 2005  MRN: [id number]  Referring provider: Self, Referral  Dx:   Encounter Diagnosis     ICD-10-CM    1. Chronic pain of right knee  M25.561     G89.29       2. Right hip pain  M25.551       3. Chronic midline low back pain without sciatica  M54.50     G89.29                      Subjective: Pt reports having some tightness in L SIJ region. She notes that it was present when getting on her horse yesterday but subsided after she rode. Pt is traveling to [place] for a competition. Objective: See treatment diary below      Assessment: Implemented AP hip mobilizations on the left to improve both hip and SIJ mobility/discomfort. Mild stiffness present at L UPA vs R UPA or CPA. Tolerated treatment well. Good benefit from hip mobilizations. Patient exhibited good technique with therapeutic exercises and would benefit from continued PT. Plan: Continue per plan of care.       Precautions: Vitamin D deficiency, Crohn's Disease      Manuals          CPA of lower lumbar spine Gr III 5' Gr III 5' Gr III 5' Gr III 5' (Focused on L UPA)          R Hip caudal glides with ER/IR Gr III 5' Gr III +L hip Gr III +L hip Gr III +L hip         R Hip lateral glide Gr III 4' Gr III +L hip Gr III +L hip Gr III +L hip         Theragun  L hamstring insertion x 3 minutes           Neuro Re-Ed             Sidelying plank with hip abduction             6 " holds in neutral 24 inches 5" x10 PT feedback  (unable to properly hold 6")  24 inches 5" x10 PT feedback  (unable to properly hold 6")          SLB with PB hip rotations  10x on ea 20x ea          90/90 hooklying with hip flexor activation             Prone Alt UE/LE lifts  2x10 on ea 2x10 on ea                                    Ther Ex             [place] 5' 7' 7' 7'         Hip IR/ER in long sitting 20x  20x          Hamstring stretch on step              Half kneeling- rotation away 15x 5x ea way c/ Lumbar spine SB            Press ups 15x 15x 3x10 20x with PT OP          Half kneeling hip rotations in abduction    10x on ea                                   Ther Activity             Kneeling press c/ hip rotations btb 10x on ea blk tb 10x on ea           Prone plank with hip extension             Gait Training                                       Modalities                                       1:1 with PT from [id number]

## 2023-08-16 ENCOUNTER — DOCUMENTATION (OUTPATIENT)
Dept: GASTROENTEROLOGY | Facility: CLINIC | Age: 18
End: 2023-08-16

## 2023-08-16 ENCOUNTER — NURSE TRIAGE (OUTPATIENT)
Age: 18
End: 2023-08-16

## 2023-08-16 ENCOUNTER — TELEPHONE (OUTPATIENT)
Dept: GASTROENTEROLOGY | Facility: CLINIC | Age: 18
End: 2023-08-16

## 2023-08-16 DIAGNOSIS — K50.012 CROHN'S DISEASE OF SMALL INTESTINE WITH INTESTINAL OBSTRUCTION (HCC): Primary | ICD-10-CM

## 2023-08-16 RX ORDER — RISANKIZUMAB-RZAA 360 MG/2.4
WEARABLE INJECTOR SUBCUTANEOUS
Qty: 2.4 ML | Refills: 5 | Status: SHIPPED | OUTPATIENT
Start: 2023-08-16 | End: 2023-08-16 | Stop reason: SDUPTHER

## 2023-08-16 RX ORDER — RISANKIZUMAB-RZAA 360 MG/2.4
WEARABLE INJECTOR SUBCUTANEOUS
Qty: 2.4 ML | Refills: 5 | Status: SHIPPED | OUTPATIENT
Start: 2023-08-16

## 2023-08-16 NOTE — TELEPHONE ENCOUNTER
Connected with the patient's mother via phone. Cecelia Stevens expressed her displeasure with the call center due to waiting 45 minutes on hold before speaking with someone and "the people working at the call center are clueless, uneducated, not helpful, and just people. I am educated and in the medical field." Cecelia Stevens expressed further that she doesn't have the time to wait on hold I offered a solution of myCharting in instead. She further expressed her displeasure for [facility] for [facility] infusions and that she will be speaking to [facility] in management there about it, complaining it's not safe. I asked her to elaborate. She said "[place] had to be stuck 2-3 times and then I had to place the IV. There's also only one nurse for 3 infusion patients and there was a lot going on there, there was an emergency that the nurse had to attend to, just unacceptable." I assured her that I will speak with leadership about this. I updated her on the authorization that was submitted for the SQ portion of Skyrizi and that the auth should be determined by 8/25 and that either myself or Alida Jerez will reach out for next steps. I reached out to Collin my point of contact at [facility] and the patient was stuck 2x for the first infusion, once for the second, and 2x for the last infusion and there was no mention of displease. I reached out to one of our infusion center managers and was told that we have a 1 nurse to 3 patient ratio for infusions too.

## 2023-08-16 NOTE — TELEPHONE ENCOUNTER
MADHU BELTRAN [phone] calling regarding prior auth submitted needs the directions in order to proceed please contact there office with this information

## 2023-08-16 NOTE — TELEPHONE ENCOUNTER
8/16 can you send RX for the OBI to [facility] Specialty please and ty

## 2023-08-16 NOTE — TELEPHONE ENCOUNTER
[address] SUBMITTED ON [facility]--[id number]    [employer]--ARCADIO RODRIGUEZ Key: [id number] - PA Case ID: [id number]

## 2023-08-18 ENCOUNTER — EVALUATION (OUTPATIENT)
Dept: PHYSICAL THERAPY | Facility: CLINIC | Age: 18
End: 2023-08-18
Payer: COMMERCIAL

## 2023-08-18 DIAGNOSIS — G89.29 CHRONIC MIDLINE LOW BACK PAIN WITHOUT SCIATICA: ICD-10-CM

## 2023-08-18 DIAGNOSIS — M25.551 RIGHT HIP PAIN: ICD-10-CM

## 2023-08-18 DIAGNOSIS — G89.29 CHRONIC PAIN OF RIGHT KNEE: Primary | ICD-10-CM

## 2023-08-18 DIAGNOSIS — M25.561 CHRONIC PAIN OF RIGHT KNEE: Primary | ICD-10-CM

## 2023-08-18 DIAGNOSIS — M54.50 CHRONIC MIDLINE LOW BACK PAIN WITHOUT SCIATICA: ICD-10-CM

## 2023-08-18 PROCEDURE — 97110 THERAPEUTIC EXERCISES: CPT | Performed by: PHYSICAL THERAPIST

## 2023-08-18 PROCEDURE — 97112 NEUROMUSCULAR REEDUCATION: CPT | Performed by: PHYSICAL THERAPIST

## 2023-08-18 PROCEDURE — 97140 MANUAL THERAPY 1/> REGIONS: CPT | Performed by: PHYSICAL THERAPIST

## 2023-08-18 NOTE — PROGRESS NOTES
[id number]    Today's date: 2023  Patient name: Clair Shaw  : 2005  MRN: [id number]  Referring provider: Torri Verma DO  Dx:   Encounter Diagnosis     ICD-10-CM    1. Chronic pain of right knee  M25.561     G89.29       2. Right hip pain  M25.551       3. Chronic midline low back pain without sciatica  M54.50     G89.29                      Assessment  Assessment details: Clair Shaw is a 21 y.o. female who presents with signs and symptoms consistent of waxing/waning lower back pain, R hip pain, and R lateral knee pain. Patient has attended 1 month of PT with good progress. Pt has demonstrated good improvements in hip mobility and lower lumbar spine mobility. Her hip motor control and strength has also improved. 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 All primary movement impairments are gradually improving, although patient continues to have intermittent symptoms depending on her work load/activity level. 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 Patient would benefit from skilled physical therapy to address the impairments, improve their level of function, and to improve their overall quality of life.     Primary movement impairments:   1) Lower L/S joint mobility restrictions- improving  2) Poor motor control of hip and lower abdominal region- improving  3) R hip joint mobility restrictions- Improving  4) Flexibility restrictions in gastroc/soleus, hamstrings, and R quadriceps - improving    Impairments: abnormal muscle firing, abnormal muscle tone, abnormal or restricted ROM, activity intolerance, impaired physical strength, lacks appropriate home exercise program and pain with function  Understanding of Dx/Px/POC: good   Prognosis: good    Goals  Short Term Goals: to be achieved by 4 weeks  1) Patient to be independent with basic HEP.-MET  2) Decrease pain to mild at its worst.-Partially met  3) Increase joint mobility in L/S to WNL -Partially met  4) Increase (hip) LE strength by 1/2 MMT grade in all deficient planes. -MET but continue    Long Term Goals: to be achieved by discharge  1) FOTO equal to or greater than expected. -Partially met  2) Pt to be able to ride horses with manageable discomfort -Partially met  3) Improve SL squat mechanics to symmetrical without compensated trendelenburg-Partially met  4) Pt to demonstrate negative DEVEN/FADIR tests of the R hip -Partially met  4) Pt to improved hamstring flexibility by 5-10 degrees bilaterally. Plan  Patient would benefit from: PT eval and skilled physical therapy  Planned therapy interventions: joint mobilization, manual therapy, patient education, neuromuscular re-education, therapeutic activities, therapeutic exercise and home exercise program  Frequency: 1x per week for 6-8 weeks. Treatment plan discussed with: patient        Subjective Evaluation    History of Present Illness  Mechanism of injury: History of Current Injury: Pt referred to PT due to R knee pain after receiving a kick to the knee in December from a horse she was working with in [place]. Pt also complains of pain at her right hip and lumbar spine. Pt reports most discomfort right now is her lower back. Pt denies any inciting injury but was treated for this condition by me last year. More recently, lower back has been aching more. 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 Pt has been very busy with working the barn. She doesn't have any days off. Pt has a medical history of Crohn's disease. She just started with [facility]. Pain location/Descriptors: P1: centralized LBP (ache). P2: Sharp lateral right hip pain. P3: lateral right knee pain.    Aggravating factors: More symptoms when off her horse and after a long day. Often increases with excessive riding. May increase with jerky movements when riding her horses. Easing factors: Massage therapist (Every 2 weeks). 24 HR pattern: Depending on activity level. Imaging: XR of knee- No acute pathology (December in 2022) while in [place]. Special Questions: Pt denies any N&T, Denies B&B abnormalities, Saddle anesthesia. Pt denies any buckling or giving way of her right knee. 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 Pain scale at highest: Moderate.       Diagnostic Tests  X-ray: normal  Treatments  Previous treatment: chiropractic, massage and physical therapy  Current treatment: chiropractic and massage  Patient Goals  Patient goals for therapy: increased strength, increased motion, decreased pain and independence with ADLs/IADLs          Objective     Active Range of Motion     Lumbar   Flexion: 117 degrees   Extension: 35 degrees  with pain  Left lateral flexion: 40 degrees    with pain  Right lateral flexion: 50 degrees   Left rotation:  Restriction level: moderate  Right rotation:  Restriction level: moderate    Additional Active Range of Motion Details  L quadrant: pain on opp  R quadrant: pain on opp   Side glide: Tightness to the left, normal to the right  *Pain with end range extension- Mild  *Pain with R SB -Mild    Joint Play   Joints within functional limits: L1, L2 and L3     Hypomobile: L4, L5 and S1     Strength/Myotome Testing     Lumbar   Left   Heel walk: normal  Toe walk: normal    Right   Heel walk: normal  Toe walk: normal    Left Hip   Planes of Motion   Flexion: 4  Extension: 4  Abduction: 3+  External rotation: 4-    Right Hip   Planes of Motion   Flexion: 4  Extension: 4+  Abduction: 4-  External rotation: 4    Left Knee   Flexion: 5  Extension: 5    Right Knee   Flexion: 5  Extension: 5    Left Ankle/Foot   Dorsiflexion: 5  Plantar flexion: 5  Great toe extension: 5    Right Ankle/Foot   Dorsiflexion: 5  Plantar flexion: 5  Great toe extension: 5    Tests     Lumbar     Left   Positive quadrant. Negative crossed SLR and passive SLR. Right   Positive quadrant. Negative crossed SLR and passive SLR. Left Pelvic Girdle/Sacrum   Negative: thigh thrust.     Left Hip   Negative DEVEN and FADIR. Right Hip   Positive DEVEN and FADIR.      Additional Tests Details  *Difficulty activating lower abdominal region  Functional squat: Shift to the R   SL squat: compensated trendelenburg more  On L vs R. Valgus bilaterally     Hamstring length:   R:85 deg (Max)  L: 78 deg (Max)     Mild restriction in R Quad compared to L   Mod restriction in Gastroc/soleus    +DEVEN/FADIR on R for hip pain  Hip ER @ 90: 75 deg R and 90 L    *Hypomobile CPA at lower L/S worse L than R       Precautions: Vitamin D deficiency, Crohn's Disease      Manuals 7/21 7/24 8/1 8/14 8/18        CPA of lower lumbar spine Gr III 5' Gr III 5' Gr III 5' Gr III 5' (Focused on L UPA)  Gr III 5' (Focused on L UPA)        R Hip caudal glides with ER/IR Gr III 5' Gr III +L hip Gr III +L hip Gr III +L hip Gr III +L hip        R Hip lateral glide Gr III 4' Gr III +L hip Gr III +L hip Gr III +L hip Gr III +L hip        Theragun  L hamstring insertion x 3 minutes           Neuro Re-Ed             Sidelying plank with hip abduction             6 " holds in neutral 24 inches 5" x10 PT feedback  (unable to properly hold 6")  24 inches 5" x10 PT feedback  (unable to properly hold 6")          SLB with PB hip rotations  10x on ea 20x ea          90/90 hooklying with hip flexor activation             Prone Alt UE/LE lifts  2x10 on ea 2x10 on ea                                    Ther Ex             [place] 5' 7' 7' 7' 7'         Hip IR/ER in long sitting 20x  20x          Hamstring stretch on step              Half kneeling- rotation away  15x 5x ea way c/ Lumbar spine SB Press ups 15x 15x 3x10 20x with PT OP          Half kneeling hip rotations in abduction    10x on ea         Side glides to the L     15x5"        Quad stretch in standing with foot on table     5x10" B        Self hip mob     Caudal glides c/ mulliga belt- HEP        Ther Activity             Kneeling press c/ hip rotations btb 10x on ea blk tb 10x on ea           Prone plank with hip extension             Gait Training                                       Modalities                                       1:1 with PT from [id number]

## 2023-08-23 ENCOUNTER — NURSE TRIAGE (OUTPATIENT)
Dept: OTHER | Facility: OTHER | Age: 18
End: 2023-08-23

## 2023-08-23 DIAGNOSIS — K50.012 CROHN'S DISEASE OF SMALL INTESTINE WITH INTESTINAL OBSTRUCTION (HCC): ICD-10-CM

## 2023-08-23 DIAGNOSIS — J02.9 SORE THROAT: ICD-10-CM

## 2023-08-23 NOTE — TELEPHONE ENCOUNTER
8/23 I called [facility] and ensured the Rx was all set for pt, and then called pts mother and lmovm that it was ready and also sent a [employer] message with all the information she would need for pharmacy and skyrizi online program.

## 2023-08-23 NOTE — TELEPHONE ENCOUNTER
Rachelle Pascal from [employer] called, asked to call back to clarify on direction Rx Jaylene, phone # [phone], reference # [id number].

## 2023-08-23 NOTE — TELEPHONE ENCOUNTER
Reason for Disposition  • Pharmacy calling with prescription question and triager unable to answer question    Additional Information  • Negative: Caller has NON-URGENT medicine question about med that PCP or specialist prescribed and triager unable to answer question    Answer Assessment - Initial Assessment Questions  1. NAME of MEDICATION: "What medicine are you calling about?"      Skyrizi 360 mg/2.4 ml   2. QUESTION: "What is your question?" (e.g., medication refill, side effect)      Otcaren Owens from [facility] called, asked Dr. Omayra Madden to clarify on Rx direction on a 1st and 2d dose. 3. PRESCRIBING HCP: "Who prescribed it?" Reason: if prescribed by specialist, call should be referred to that group.       Dr. Omayra Madden    Protocols used: MEDICATION QUESTION CALL-ADULT-[place]

## 2023-08-24 DIAGNOSIS — J02.9 SORE THROAT: ICD-10-CM

## 2023-08-24 DIAGNOSIS — K50.012 CROHN'S DISEASE OF SMALL INTESTINE WITH INTESTINAL OBSTRUCTION (HCC): ICD-10-CM

## 2023-08-24 RX ORDER — DIPHENHYDRAMINE HYDROCHLORIDE AND LIDOCAINE HYDROCHLORIDE AND ALUMINUM HYDROXIDE AND MAGNESIUM HYDRO
10 KIT EVERY 4 HOURS PRN
Qty: 119 ML | Refills: 2 | Status: SHIPPED | OUTPATIENT
Start: 2023-08-24 | End: 2023-08-25

## 2023-08-24 RX ORDER — DIPHENHYDRAMINE HYDROCHLORIDE AND LIDOCAINE HYDROCHLORIDE AND ALUMINUM HYDROXIDE AND MAGNESIUM HYDRO
10 KIT EVERY 4 HOURS PRN
Refills: 2 | OUTPATIENT
Start: 2023-08-24

## 2023-08-24 RX ORDER — DIPHENHYDRAMINE HYDROCHLORIDE AND LIDOCAINE HYDROCHLORIDE AND ALUMINUM HYDROXIDE AND MAGNESIUM HYDRO
10 KIT EVERY 4 HOURS PRN
Qty: 119 ML | Refills: 0 | Status: SHIPPED | OUTPATIENT
Start: 2023-08-24 | End: 2023-08-24

## 2023-08-25 RX ORDER — DIPHENHYDRAMINE HYDROCHLORIDE AND LIDOCAINE HYDROCHLORIDE AND ALUMINUM HYDROXIDE AND MAGNESIUM HYDRO
10 KIT EVERY 4 HOURS PRN
Qty: 119 ML | Refills: 0 | Status: SHIPPED | OUTPATIENT
Start: 2023-08-25

## 2023-08-25 NOTE — TELEPHONE ENCOUNTER
Patient's mother is requesting a call back from [facility] regarding magic mouthwash medication. States that the pharmacy can't fill medication because it must be compounded. Patient and mother are confused, and want to speak with [facility] because they have been working with her. Please follow up.

## 2023-08-25 NOTE — TELEPHONE ENCOUNTER
Patient currently in [place] and after plane ride experiencing sore throat symptoms has she previously experienced last month. Pt did not bring magic mouthwash on trip and is requesting ref[facility] to [facility] [place].

## 2023-08-25 NOTE — TELEPHONE ENCOUNTER
[facility] [place] [facility]. Near by [facility] at [address] available. I called and spoke with the patient. Pt states throat is feeling a little better, if pt decides she would like medication- provided phone number [phone] of [facility] on [phone] to ask for [facility] prescription to be transferred between pharmacies.

## 2023-08-28 NOTE — TELEPHONE ENCOUNTER
Called patient, reached voicemail, left message to call back if pt has questions regarding magic mouthwash. Called patient's mother, reached voicemail, left message returning phone call from Friday and to call back.

## 2023-09-28 ENCOUNTER — OFFICE VISIT (OUTPATIENT)
Dept: FAMILY MEDICINE CLINIC | Facility: MEDICAL CENTER | Age: 18
End: 2023-09-28
Payer: COMMERCIAL

## 2023-09-28 VITALS
WEIGHT: 113 LBS | BODY MASS INDEX: 18.16 KG/M2 | HEART RATE: 88 BPM | OXYGEN SATURATION: 98 % | RESPIRATION RATE: 16 BRPM | TEMPERATURE: 98.8 F | HEIGHT: 66 IN | DIASTOLIC BLOOD PRESSURE: 68 MMHG | SYSTOLIC BLOOD PRESSURE: 114 MMHG

## 2023-09-28 DIAGNOSIS — Z00.00 ANNUAL PHYSICAL EXAM: Primary | ICD-10-CM

## 2023-09-28 PROCEDURE — 99395 PREV VISIT EST AGE 18-39: CPT | Performed by: STUDENT IN AN ORGANIZED HEALTH CARE EDUCATION/TRAINING PROGRAM

## 2023-09-28 NOTE — PROGRESS NOTES
[facility] HEALTH MAINTENANCE OFFICE VISIT  [facility] - [facility] [place]    NAME: Danisha Chicas  AGE: 21 y.o. SEX: female  : 2005     DATE: 2023    Assessment and Plan     1. Annual physical exam      · Patient Counseling:   · Nutrition: Stressed importance of a well balanced diet, moderation of sodium/saturated fat, caloric balance and sufficient intake of fiber  · Exercise: Stressed the importance of regular exercise with a goal of 150 minutes per week  · Dental Health: Discussed daily flossing and brushing and regular dental visits   · Wears helmet in appropriate settings, wear seatbelt in the car, working smoke detectors at home  · Immunizations reviewed:  · Received initial COVID-19 vaccination series, declines further vaccines at this time  · Declines influenza vaccine  · Immunizations otherwise up-to-date  · Discussed benefits of:   · Declines Hep C and HIV screenings   · History of Crohn's disease, last colonoscopy was in 2023:  Impression:            - Preparation of the colon was fair.                          - Simple Endoscopic Score for Crohn's Disease: 9,                          mucosal inflammatory changes secondary to Crohn's                          disease with ileitis. Biopsied.                          - One 4 mm polyp with appearance suggestive of                          hyperplastic polyp in the sigmoid colon, removed with                          a cold snare. Resected and retrieved. Recommendation:        - Discharge patient to home.                          - Patient has a contact number available for                          emergencies. The signs and symptoms of potential                          delayed complications were discussed with the patient.                          Return to normal activities tomorrow.  Written                          discharge instructions were provided to the patient.                          - Advance diet as tolerated.                          - Await pathology results.                          - Return to IBD clinic as previously scheduled. BMI Counseling: Body mass index is 18.1 kg/m². BMI Counseling: Body mass index is 18.1 kg/m². The BMI is below normal. Dietary education for weight gain was provided. Rationale for BMI follow-up plan is due to patient being underweight. Depression Screening and Follow-up Plan: Patient was screened for depression during today's encounter. They screened negative with a PHQ-2 score of 0. Follow up in 1 year for next annual physical and sooner as needed.    Chief Complaint     Chief Complaint   Patient presents with   • Annual Exam       History of Present Illness     HPI    Well Adult Physical   Patient here for a comprehensive physical exam.      Diet and Physical Activity  Diet: gluten-free, lactose-free, well-balanced she reports, no red meat   Exercise: several times per week      Depression Screen  PHQ-2/9 Depression Screening    Little interest or pleasure in doing things: 0 - not at all  Feeling down, depressed, or hopeless: 0 - not at all  PHQ-2 Score: 0  PHQ-2 Interpretation: Negative depression screen          General Health  Hearing: Normal:  bilateral  Vision: most recent eye exam >1 year  Dental: regular dental visits, brushes teeth twice daily and flosses teeth occasionally    Reproductive Health  Regular Periods      The following portions of the patient's history were reviewed and updated as appropriate: allergies, current medications, past family history, past medical history, past social history, past surgical history and problem list.    Review of Systems     Review of Systems    As noted in HPI    Past Medical History     Past Medical History:   Diagnosis Date   • Abnormal weight loss     last assessed: 1/15/15   • Anemia     resolved: 12/10/15   • Asthma    • Calcaneal apophysitis     left; lasts assessed: 2/17/14   • Crohn's disease [facility])    • Mass of breast, left     last assessed: 6/9/15   • Unspecified subluxation of left patella, initial encounter     last assessed: 2/2/16   • Vitamin D deficiency        Past Surgical History     Past Surgical History:   Procedure Laterality Date   • COLONOSCOPY     • ESOPHAGOGASTRODUODENOSCOPY     • PORT WINE STAIN REMOVAL W/ LASER         Social History     Social History     Socioeconomic History   • Marital status: Single     Spouse name: None   • Number of children: None   • Years of education: None   • Highest education level: None   Occupational History   • None   Tobacco Use   • Smoking status: Never   • Smokeless tobacco: Never   Substance and Sexual Activity   • Alcohol use: No   • Drug use: No   • Sexual activity: None   Other Topics Concern   • None   Social History Narrative    Lives with parents     Social Determinants of Health     Financial Resource Strain: Not on file   Food Insecurity: Not on file   Transportation Needs: Not on file   Physical Activity: Not on file   Stress: Not on file   Social Connections: Not on file   Intimate Partner Violence: Not on file   Housing Stability: Not on file       Family History     Family History   Problem Relation Age of Onset   • Hypothyroidism Mother    • No Known Problems Father    • Cancer Family    • ROJELIO disease Family    • Allergies Family    • Hypothyroidism Maternal Grandmother        Current Medications       Current Outpatient Medications:   •  ascorbic acid (VITAMIN C) 250 mg tablet, Take 500 mg by mouth daily, Disp: , Rfl:   •  Cholecalciferol (VITAMIN D3) 2000 units capsule, Take 1 capsule by mouth daily Take 3000 iu daily , Disp: , Rfl:   •  diphenhydramine, lidocaine, Al/Mg hydroxide, simethicone (Magic Mouthwash) SUSP, Swish and swallow 10 mL every 4 (four) hours as needed for mouth pain or discomfort, Disp: 119 mL, Rfl: 0  •  ondansetron (ZOFRAN-ODT) 4 mg disintegrating tablet, Take 1 tablet (4 mg total) by mouth every 6 (six) hours as needed for nausea or vomiting, Disp: 20 tablet, Rfl: 0  •  risankizumab-rzaa (Skyrizi) 360 MG/2.4ML SOCT, Take first on body injector (OBI) under skin on week 12 then every 8 weeks there after, Disp: 2.4 mL, Rfl: 5  •  Risankizumab-rzaa (SKYRIZI IV), Inject into a catheter in a vein (Patient not taking: Reported on 9/28/2023), Disp: , Rfl:      Allergies     Allergies   Allergen Reactions   • Gluten Meal - Food Allergy    • Seasonal Ic [Cholestatin]    • Tilactase        Objective     /68 (BP Location: Left arm, Patient Position: Sitting, Cuff Size: Adult)   Pulse 88   Temp 98.8 °F (37.1 °C)   Resp 16   Ht 5' 6.25" (1.683 m)   Wt 51.3 kg (113 lb)   LMP 09/03/2023 (Exact Date)   SpO2 98%   BMI 18.10 kg/m²      Physical Exam  Vitals reviewed. Constitutional:       General: She is not in acute distress. Appearance: Normal appearance. She is not ill-appearing or toxic-appearing. HENT:      Head: Normocephalic and atraumatic. Right Ear: External ear normal.      Left Ear: External ear normal.      Nose: Nose normal.      Mouth/Throat:      Mouth: Mucous membranes are moist.      Pharynx: Oropharynx is clear. Eyes:      Extraocular Movements: Extraocular movements intact. Conjunctiva/sclera: Conjunctivae normal.      Pupils: Pupils are equal, round, and reactive to light. Cardiovascular:      Rate and Rhythm: Normal rate and regular rhythm. Pulses: Normal pulses. Heart sounds: Normal heart sounds. Pulmonary:      Effort: Pulmonary effort is normal.      Breath sounds: Normal breath sounds. Abdominal:      General: Abdomen is flat. Bowel sounds are normal.      Palpations: Abdomen is soft. Tenderness: There is no abdominal tenderness. Musculoskeletal:      Cervical back: Neck supple. Right lower leg: No edema. Left lower leg: No edema. Lymphadenopathy:      Cervical: No cervical adenopathy. Skin:     General: Skin is warm and dry.       Capillary Refill: Capillary refill takes less than 2 seconds. Neurological:      Mental Status: She is alert and oriented to person, place, and time. Psychiatric:         Mood and Affect: Mood normal.         Behavior: Behavior normal.         Thought Content:  Thought content normal.         Judgment: Judgment normal.               Mary Kothari DO  [facility] [place]

## 2023-10-16 ENCOUNTER — TELEPHONE (OUTPATIENT)
Age: 18
End: 2023-10-16

## 2023-10-16 NOTE — TELEPHONE ENCOUNTER
LMOM to clarify for mom that if labs are being drawn at [facility], papers aren't needed. No need to make the extra trip. However if being drawn at outside site, orders will be needed. Also, they were not ordered by us however we can certainly accommodate her- just so she's aware we aren't the ordering provider.

## 2023-10-16 NOTE — TELEPHONE ENCOUNTER
Patients mother  called will stop by office tomorrow for a copy of the lab orders dated from 7/17/23

## 2023-10-24 NOTE — PROGRESS NOTES
[facility] - Outpatient Follow-up Note  Sofia Miller 21 y.o. female MRN: [id number]  Encounter: [id number]          ASSESSMENT AND PLAN:    Sofia Miller is a 21 y.o. female with Crohn's disease (onset 2013 with pain and CT with TI involvement) with multiple episodes of small bowel obstructions including July and November 2022, last seen May 2023 who now presents for follow-up. Had previously been maintained on oral Pentasa for several years, subsequently with weekly methotrexate for the majority of 2020, then Entocort. Currently doing well overall, but she does notice a change in appetite in the morning recently. She has not experienced this in some time. MR enterography from March 2023 with a 1.2 cm inflammatory stricture without any upstream dilation and colonoscopy from March 2023 with fair prep, simple endoscopic score for Crohn's disease 9 with ileitis and a 4 mm polyp. Most recent lab work several months ago with calprotectin 153, CMP normal, ferritin 281 with iron of 54 and iron sat of 19, CRP of 3.7. Normal white blood cell count, hemoglobin, platelets. Prior vitamin D 36.7. 1. Crohn's disease of small intestine with intestinal obstruction (HCC)    2. Abdominal pain, unspecified abdominal location    3. Alteration in appetite    4. Immunocompromised state ([address])        No orders of the defined types were placed in this encounter. Continue Skyrizi  Next blood work due now  Next quant gold and hepatitis panel March 2024  Next imaging March 2024  Colonoscopy TBD  We discussed increasing protein and calories  Increase water intake  Low residue diet (avoid leafy green vegetables, raw fruits and vegetables especially with skin, nuts, seeds, corn/popcorn).    Drink at least 8 cups of water per day    Avoid live virus vaccines  Yearly flu shot  COVID vaccine and booster  Pneumonia vaccine  Shingrix  Routine Pap smear  Routine skin exams with the dermatologist    Consider pepcid at bedtime if no improvement of appetite after Skyrizi    ______________________________________________________________________    SUBJECTIVE:    Miriam Mcdermott is a 21 y.o. female who presents with complaint of Crohn's. Overall feeling well. Some active stress. No flares. She gets some bloating. She will wake up starving and she does not feel like she can eat. Sometimes she can wake up and eat anything. She is coming up for the next dose. Next injection due this month. She felt well after the last injection. Sometime over the summer she had this. Her appetite is not normal. She avoid gluten, dairy, red meat. She has trouble getting enough food. She has protein shakes 1-2 times per day. Vegan protein. She eats and gets tired. She does graze per her mother. She eats rice, sweet potatoes. She eats 1500 calories per day. Weight has been steady. She stopped the pantoprazole. 1-2 BMs per day, + formed, bristol 1-2  no bright red blood per rectum/rectal bleeding, no melena  no urgency  No abdominal pain      REVIEW OF SYSTEMS IS OTHERWISE NEGATIVE.   10 point ROS reviewed and negative, except as above      Historical Information   Past Medical History:   Diagnosis Date    Abnormal weight loss     last assessed: 1/15/15    Anemia     resolved: 12/10/15    Asthma     Calcaneal apophysitis     left; lasts assessed: 2/17/14    Crohn's disease ([address])     Mass of breast, left     last assessed: 6/9/15    Unspecified subluxation of left patella, initial encounter     last assessed: 2/2/16    Vitamin D deficiency      Past Surgical History:   Procedure Laterality Date    COLONOSCOPY      ESOPHAGOGASTRODUODENOSCOPY      PORT WINE STAIN REMOVAL W/ LASER       Social History   Social History     Substance and Sexual Activity   Alcohol Use No     Social History     Substance and Sexual Activity   Drug Use No     Social History     Tobacco Use   Smoking Status Never   Smokeless Tobacco Never Family History   Problem Relation Age of Onset    Hypothyroidism Mother     No Known Problems Father     Cancer Family     ROJELIO disease Family     Allergies Family     Hypothyroidism Maternal Grandmother        Meds/Allergies       Current Outpatient Medications:     ascorbic acid (VITAMIN C) 250 mg tablet    Cholecalciferol (VITAMIN D3) 2000 units capsule    diphenhydramine, lidocaine, Al/Mg hydroxide, simethicone (Magic Mouthwash) SUSP    ondansetron (ZOFRAN-ODT) 4 mg disintegrating tablet    risankizumab-rzaa (Skyrizi) 360 MG/2.4ML SOCT    Allergies   Allergen Reactions    Gluten Meal - Food Allergy     Seasonal Ic [Cholestatin]     Tilactase            Objective     Blood pressure 106/66, pulse 74, temperature 98.6 °F (37 °C), temperature source Tympanic, height 5' 7" (1.702 m), weight 52.7 kg (116 lb 3.2 oz), last menstrual period 09/03/2023, SpO2 99 %. Body mass index is 18.2 kg/m². PHYSICAL EXAMINATION:    General Appearance:   Alert, cooperative, no distress   HEENT:  Normocephalic, atraumatic, anicteric. Neck supple, symmetrical, trachea midline. Lungs:   Equal chest rise and unlabored breathing, normal effort, no coughing. Cardiovascular:   No visualized JVD. Abdomen:   No abdominal distension. Skin:   No jaundice, rashes, or lesions. Musculoskeletal:   Normal range of motion visualized. Psych:  Normal affect and normal insight. Neuro:  Alert and appropriate. Lab Results:   No visits with results within 1 Day(s) from this visit.    Latest known visit with results is:   Office Visit on 07/18/2023   Component Date Value     RAPID STREP A 07/18/2023 Negative     SARS-CoV-2 07/18/2023 Negative     INFLUENZA A PCR 07/18/2023 Negative     INFLUENZA B PCR 07/18/2023 Negative     Throat Culture 07/18/2023 Negative for beta-hemolytic Streptococcus        Lab Results   Component Value Date    WBC 8.54 12/30/2020    HGB 13.6 12/30/2020    HCT 42.6 12/30/2020    MCV 92 12/30/2020     12/30/2020       Lab Results   Component Value Date     12/02/2015    SODIUM 137 12/30/2020    K 4.2 12/30/2020     12/30/2020    CO2 27 12/30/2020    ANIONGAP 8 12/02/2015    AGAP 4 12/30/2020    BUN 7 12/30/2020    CREATININE 0.63 12/30/2020    GLUC 75 12/09/2016    GLUF 88 12/30/2020    CALCIUM 9.8 12/30/2020    AST 15 12/30/2020    ALT 19 12/30/2020    ALKPHOS 59 12/30/2020    PROT 7.1 12/02/2015    TP 8.3 (H) 12/30/2020    BILITOT 0.38 12/02/2015    TBILI 0.61 12/30/2020    EGFR  03/01/2016      Comment:      eGFR calculation is only valid for adults 18 years and older. Lab Results   Component Value Date    CRP <3.0 07/28/2020       Lab Results   Component Value Date    PRU8XVMTDKYC 0.698 12/30/2020       Lab Results   Component Value Date    IRON 81 10/09/2019    TIBC 348 10/09/2019    FERRITIN 16 10/09/2019       Radiology Results:   No results found.

## 2023-10-25 ENCOUNTER — OFFICE VISIT (OUTPATIENT)
Dept: GASTROENTEROLOGY | Facility: CLINIC | Age: 18
End: 2023-10-25
Payer: COMMERCIAL

## 2023-10-25 VITALS
BODY MASS INDEX: 18.24 KG/M2 | DIASTOLIC BLOOD PRESSURE: 66 MMHG | WEIGHT: 116.2 LBS | SYSTOLIC BLOOD PRESSURE: 106 MMHG | HEART RATE: 74 BPM | HEIGHT: 67 IN | OXYGEN SATURATION: 99 % | TEMPERATURE: 98.6 F

## 2023-10-25 DIAGNOSIS — D84.9 IMMUNOCOMPROMISED STATE (HCC): ICD-10-CM

## 2023-10-25 DIAGNOSIS — K50.012 CROHN'S DISEASE OF SMALL INTESTINE WITH INTESTINAL OBSTRUCTION (HCC): Primary | ICD-10-CM

## 2023-10-25 DIAGNOSIS — R63.8 ALTERATION IN APPETITE: ICD-10-CM

## 2023-10-25 DIAGNOSIS — R10.9 ABDOMINAL PAIN, UNSPECIFIED ABDOMINAL LOCATION: ICD-10-CM

## 2023-10-25 PROCEDURE — 99214 OFFICE O/P EST MOD 30 MIN: CPT | Performed by: INTERNAL MEDICINE

## 2023-11-29 ENCOUNTER — NURSE TRIAGE (OUTPATIENT)
Age: 18
End: 2023-11-29

## 2023-11-29 NOTE — TELEPHONE ENCOUNTER
Mom who is calling on behalf of patient and on communication consent needs an updated letter for the insurance company stating that the patient still has Crohn's disease and is still requiring treatment of crohn's. Patient can receive letters through [employer]. Reason for Disposition   Information only question and nurse able to answer    Answer Assessment - Initial Assessment Questions  Mom who is calling on behalf of patient and on communication consent needs an updated letter for the insurance company stating that the patient still has Crohn's disease and is still requiring treatment of crohn's. Protocols used:  Information Only Call - No Triage-ADULT-[place]

## 2023-11-29 NOTE — TELEPHONE ENCOUNTER
Spoke with Laly Wilkins - letter is not for any pending insurance authorizations. Letter is to confirm that she still has the medical condition crohns disease and is still receiving treatment. Laly Wilkins said this come up every few years and there is a previous letter in the chart we can review and write something similar.     Letter from Dr Carolee Gomez - I didn't see it in [address] [place] [address]    But there is also a letter - 5/13/2019 from Dr Edie Olsen office

## 2023-12-22 ENCOUNTER — OFFICE VISIT (OUTPATIENT)
Dept: OBGYN CLINIC | Facility: CLINIC | Age: 18
End: 2023-12-22
Payer: COMMERCIAL

## 2023-12-22 VITALS
BODY MASS INDEX: 18.05 KG/M2 | SYSTOLIC BLOOD PRESSURE: 100 MMHG | WEIGHT: 115 LBS | HEIGHT: 67 IN | DIASTOLIC BLOOD PRESSURE: 62 MMHG

## 2023-12-22 DIAGNOSIS — N94.2 VAGINISMUS: ICD-10-CM

## 2023-12-22 DIAGNOSIS — N76.2 RECURRENT VULVITIS: ICD-10-CM

## 2023-12-22 DIAGNOSIS — Z01.411 ENCOUNTER FOR GYNECOLOGICAL EXAMINATION WITH ABNORMAL FINDING: Primary | ICD-10-CM

## 2023-12-22 PROCEDURE — S0610 ANNUAL GYNECOLOGICAL EXAMINA: HCPCS | Performed by: PHYSICIAN ASSISTANT

## 2023-12-22 NOTE — PROGRESS NOTES
Assessment/Plan:    No problem-specific Assessment & Plan notes found for this encounter.       Diagnoses and all orders for this visit:    Encounter for gynecological examination with abnormal finding    Recurrent vulvitis  -     NuSwab Vaginitis (VG)    Vaginismus  -     Ambulatory Referral to Physical Therapy; Future        First Pap at age 19.  Vaginal cultures done to r/o persistent BV or yeast infection. Patient to use hypoallergenic body wash, laundry detergent, dryer sheets, etc. Wear cotton underwear if possible; avoid panty liners. Can use diaper cream; try external moisturizer like Cetaphil or Cerave.  Discussed vaginal pain; possible vaginismus.  Recommended pelvic floor PT; patient amenable.  Referral entered; she will call for appointment.  Stressed consistent condom use for STD and pregnancy prevention.  If no problems, patient to return in 1 year for routine gyn care.    Subjective:      Patient ID: Harry Corley is a 18 y.o. female.    Patient is here for yearly gyn exam.  It has been 3 years since her last visit.  States she is doing well overall.  Periods are regular every month, and bleeding lasts for 5-7 days.  She denies heavy bleeding, severe cramping, HA, and mood symptoms.  Patient has never been sexually active with penetration.  States she has difficulty with inserting tampons and pain with more than a finger in her vagina.  Experiences recurrent vulvar and vaginal introitus irritation.  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  Has tried different underwear, riding pants, diaper cream, etc.  Followed by GI in [place], where she spends half the year, for Crohn's disease.  Currently using Skyrizi for treatment; symptoms are currently stable.  Denies bladder changes, pelvic pain, and thyroid disease.  History of endometriosis in patient's mother.  Patient denies severe period cramps and pelvic pain outside of her Crohn's flares.    Patient is  "performing self-breast exam.  Notes tenderness after ovulation.  Denies new masses, skin changes, and nipple discharge.        The following portions of the patient's history were reviewed and updated as appropriate: allergies, current medications, past family history, past medical history, past social history, past surgical history, and problem list.    Review of Systems   Constitutional:  Negative for appetite change and unexpected weight change.   Cardiovascular:         No masses, skin changes, nipple discharge, and pain/tenderness.   Gastrointestinal:  Positive for abdominal pain. Negative for abdominal distention, constipation, diarrhea, nausea and vomiting.   Genitourinary:  Positive for dyspareunia. Negative for difficulty urinating, dysuria, frequency, genital sores, hematuria, menstrual problem, pelvic pain, urgency, vaginal bleeding, vaginal discharge and vaginal pain.         Objective:      /62 (BP Location: Left arm, Patient Position: Sitting, Cuff Size: Adult)   Ht 5' 7\" (1.702 m)   Wt 52.2 kg (115 lb)   LMP 12/06/2023   BMI 18.01 kg/m²          Physical Exam  Vitals reviewed. Exam conducted with a chaperone present.   Constitutional:       Appearance: Normal appearance. She is well-developed and normal weight.   Neck:      Thyroid: No thyromegaly.   Pulmonary:      Effort: Pulmonary effort is normal.   Chest:   Breasts:     Breasts are symmetrical.      Right: Normal. No swelling, bleeding, inverted nipple, mass, nipple discharge, skin change or tenderness.      Left: Normal. No swelling, bleeding, inverted nipple, mass, nipple discharge, skin change or tenderness.   Abdominal:      General: There is no distension.      Palpations: Abdomen is soft.      Tenderness: There is no abdominal tenderness.   Genitourinary:     Pubic Area: No rash.       Labia:         Right: No rash, tenderness, lesion or injury.         Left: No rash, tenderness, lesion or injury.       Vagina: Normal. No vaginal " discharge, erythema, tenderness or bleeding.      Cervix: Normal.      Uterus: Normal.       Adnexa: Right adnexa normal and left adnexa normal.        Right: No mass, tenderness or fullness.          Left: No mass, tenderness or fullness.            Comments: Area of very mild erythema  Musculoskeletal:      Cervical back: Neck supple.   Lymphadenopathy:      Cervical: No cervical adenopathy.      Upper Body:      Right upper body: No supraclavicular or axillary adenopathy.      Left upper body: No supraclavicular or axillary adenopathy.      Lower Body: No right inguinal adenopathy. No left inguinal adenopathy.   Skin:     General: Skin is warm and dry.   Neurological:      Mental Status: She is alert and oriented to person, place, and time.   Psychiatric:         Behavior: Behavior normal. Behavior is cooperative.         Thought Content: Thought content normal.         Judgment: Judgment normal.

## 2023-12-22 NOTE — PATIENT INSTRUCTIONS
Patient to use hypoallergenic body wash, laundry detergent, dryer sheets, etc. Wear cotton underwear if possible; avoid panty liners. Can use diaper cream; try external moisturizer like Cetaphil or Cerave.      Call PT for appointment.    Stressed consistent condom use for STD and pregnancy prevention.

## 2024-01-03 ENCOUNTER — TELEPHONE (OUTPATIENT)
Dept: LABOR AND DELIVERY | Facility: HOSPITAL | Age: 19
End: 2024-01-03

## 2024-01-03 ENCOUNTER — TELEPHONE (OUTPATIENT)
Dept: OBGYN CLINIC | Facility: CLINIC | Age: 19
End: 2024-01-03

## 2024-01-03 DIAGNOSIS — B37.31 VAGINAL CANDIDIASIS: Primary | ICD-10-CM

## 2024-01-03 LAB
A VAGINAE DNA VAG QL NAA+PROBE: ABNORMAL SCORE
BVAB2 DNA VAG QL NAA+PROBE: ABNORMAL SCORE
C ALBICANS DNA VAG QL NAA+PROBE: POSITIVE
C GLABRATA DNA VAG QL NAA+PROBE: NEGATIVE
MEGA1 DNA VAG QL NAA+PROBE: ABNORMAL SCORE
T VAGINALIS RRNA SPEC QL NAA+PROBE: NEGATIVE

## 2024-01-03 NOTE — TELEPHONE ENCOUNTER
Patient called, left message on answering machine, would like to review labs completed on 12/22/23. Reviewed labs, awaiting provider review.

## 2024-01-03 NOTE — TELEPHONE ENCOUNTER
Placed call to patient, reviewed lab results and follow up medication instructions. Patient verbalized understanding and had no additional questions.

## 2024-01-05 ENCOUNTER — TELEPHONE (OUTPATIENT)
Dept: OBGYN CLINIC | Facility: CLINIC | Age: 19
End: 2024-01-05

## 2024-01-05 NOTE — TELEPHONE ENCOUNTER
Spoke with patient regarding vaginal culture results - positive for Candida.  Rx for Monistat 7 day vaginal cream already sent.  Patient to call if symptoms do not resolve or reoccur.

## 2024-01-08 ENCOUNTER — TELEPHONE (OUTPATIENT)
Dept: FAMILY MEDICINE CLINIC | Facility: MEDICAL CENTER | Age: 19
End: 2024-01-08

## 2024-01-08 DIAGNOSIS — R55 VASOVAGAL EPISODE: Primary | ICD-10-CM

## 2024-01-08 RX ORDER — AMMONIA INHALANTS 0.05 G/.33ML
1 INHALANT RESPIRATORY (INHALATION) ONCE
Qty: 12 EACH | Refills: 0 | Status: SHIPPED | OUTPATIENT
Start: 2024-01-08 | End: 2024-01-08

## 2024-01-08 NOTE — TELEPHONE ENCOUNTER
Patient's mother was in for office visit today and requested inhalant as patient had 2 vasovagal episodes so far with Skyrizi injection.  Ammonia inhalant sent to pharmacy.  Please inform mother.

## 2024-02-06 ENCOUNTER — TELEPHONE (OUTPATIENT)
Dept: GASTROENTEROLOGY | Facility: CLINIC | Age: 19
End: 2024-02-06

## 2024-02-06 NOTE — TELEPHONE ENCOUNTER
Joanna from pre auth dept for [facility] called in stating that the Skyrizi is already approved for 12 months. Good from 8/16/23 to 8/2024. Their call back number if needed is [phone].

## 2024-02-07 NOTE — TELEPHONE ENCOUNTER
2/7 auth submitted on cmm through primary ins    ARCADIO RODRIGUEZ Key: [id number] - PA Case ID: [id number]

## 2024-02-09 NOTE — TELEPHONE ENCOUNTER
2/9 Loc Approved    ARCADIOKARINA RODRIGUEZ Key: [id number] - PA Case ID: [id number]  Need help? Call us at [phone]  Outcome   Approvedon February 7  Request Reference Number: [id number]. SKYRIZI /2.4 is approved through 02/07/2025. Please refer to the fax or electronic case notice for further information.

## 2024-02-15 ENCOUNTER — NURSE TRIAGE (OUTPATIENT)
Age: 19
End: 2024-02-15

## 2024-02-15 NOTE — TELEPHONE ENCOUNTER
"  Pt calling in, reports she is due for her Skyrizi injection on Tuesday 2/20 but is asking if it would be okay to do it two days earlier 2/18 instead. She likes to have her mom with her when she does it and that would have to be done on Sunday then. Please advise if okay for pt to do skyrizi two days earlier.   Reason for Disposition   Information only question and nurse able to answer    Answer Assessment - Initial Assessment Questions  1. REASON FOR CALL or QUESTION: \"What is your reason for calling today?\" or \"How can I best help you?\" or \"What question do you have that I can help answer?\"      Skyrizi injection    Protocols used: Information Only Call - No Triage-ADULT-[place]    "

## 2024-02-16 NOTE — TELEPHONE ENCOUNTER
Lvm with Pt related message. Stated if pt has any other questions or concerns to call our office number.

## 2024-03-25 ENCOUNTER — TELEPHONE (OUTPATIENT)
Dept: GASTROENTEROLOGY | Facility: CLINIC | Age: 19
End: 2024-03-25

## 2024-03-25 NOTE — TELEPHONE ENCOUNTER
Called patient, reached voicemail, left message to call back to confirm patient has reached out to [facility] to schedule Skyrizi OBI delivery.

## 2024-03-25 NOTE — TELEPHONE ENCOUNTER
Patient's mother called stating that medication is supposed to be delivered today. She is uncertain why because her daughter does not need it until 4 - 6 weeks from now.  She is reaching out to [facility] again to check on everything and will call back if need be.  [phone]

## 2024-03-25 NOTE — TELEPHONE ENCOUNTER
I spoke with the patient's mother. Loc OBI to be delivered 3/26/24, mother will reach out if medication is not delivered. Mother aware Skyrizi OBI every 8 weeks, pt due end of April.

## 2024-03-25 NOTE — TELEPHONE ENCOUNTER
3/25 received fax that pt has not answered calls from [facility] trying to set up delivery for her Skyrizi. Can you call pt to remind her she has to set up delivery . TY

## 2024-04-22 ENCOUNTER — NURSE TRIAGE (OUTPATIENT)
Age: 19
End: 2024-04-22

## 2024-04-22 NOTE — TELEPHONE ENCOUNTER
Mother calling regarding follow up from last week. Has not taken antibiotic yet, ear pain/clogged on and off.   Would like to start Skyrizi tonight instead of tomorrow, is that ok?

## 2024-04-22 NOTE — TELEPHONE ENCOUNTER
I spoke with the patient's mother. TT Dr. Metz. Per Dr. Metz's recommendation is continue with Skryzi injection today and if patient continues to feel ear pain to go to PCP tomorrow. Pt denies fever, denies ear drainage.

## 2024-05-20 ENCOUNTER — EVALUATION (OUTPATIENT)
Dept: PHYSICAL THERAPY | Facility: REHABILITATION | Age: 19
End: 2024-05-20
Payer: COMMERCIAL

## 2024-05-20 DIAGNOSIS — M62.89 PELVIC FLOOR TENSION: Primary | ICD-10-CM

## 2024-05-20 DIAGNOSIS — N94.2 VAGINISMUS: ICD-10-CM

## 2024-05-20 PROCEDURE — 97162 PT EVAL MOD COMPLEX 30 MIN: CPT | Performed by: PHYSICAL THERAPIST

## 2024-05-20 PROCEDURE — 97530 THERAPEUTIC ACTIVITIES: CPT | Performed by: PHYSICAL THERAPIST

## 2024-05-20 NOTE — PROGRESS NOTES
PT Evaluation     Today's date: 2024  Patient name: Harry Corley  : 2005  MRN: [id number]  Referring provider: Jes Yao P*  Dx:   Encounter Diagnosis     ICD-10-CM    1. Pelvic floor tension  M62.89       2. Vaginismus  N94.2 Ambulatory Referral to Physical Therapy                     Assessment  Impairments: abnormal coordination, abnormal muscle firing, abnormal muscle tone, abnormal or restricted ROM, activity intolerance, lacks appropriate home exercise program and pain with function  Symptom irritability: moderate    Assessment details: Harry Corley is a 18 y.o. female who presents with concerns of difficulty using tampons, tolerating exams or tolerating penetration with her partner. 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 Examination reveals hypertonicity along her entire levator ani, especially deep layer 3, with poor strength and holding endurance and poor inability to release and relax her muscles. Mild tenderness throughout although she was able to tolerate full digital palpation.     The plan of care was discussed and included education regarding pelvic floor anatomy, explanation of exam technique, explanation of exam findings and discussion of treatment plan as well as the importance of patient compliance and adherence to physical therapy visits. Patient would benefit from skilled physical therapy services  to address deficits and ultimately meet goal of independent self management of condition.     Patient provided written and verbal consent for pelvic floor muscle exam: yes        Understanding of Dx/Px/POC: good     Prognosis: good    Goals  STGs to be met in 6 weeks:  * Patient will be compliant with introductory HEP as prescribed.  * Patient will report 50% less pain with palpation or use of tampon.    LTGs to be met by discharge:  * Patient will present with reduced VPQ score of 50% to indicate improved tolerance to palpation and  "examination.   * Normalize findings on sEMG to indicate PFM strength average of at least 12uV and resting average < 2.5uV.  * Implements relaxation strategies on a daily basis.  * Patient will report 80% reduction in discomfort with either palpation or intimacy.  * Patient will be proficient and compliant with use of vaginal dilator (s) for progression of self PFM stretching in 3-4 visits if indicated during treatment.    * Patient will be compliant with comprehensive home exercise program for self management of condition.       Plan  Patient would benefit from: skilled physical therapy  Referral necessary: No  Planned modality interventions: biofeedback    Planned therapy interventions: manual therapy, neuromuscular re-education, stretching, therapeutic activities, therapeutic exercise, therapeutic training, home exercise program and breathing training    Frequency: 1x week  Duration in weeks: 12  Plan of Care beginning date: 5/20/2024  Plan of Care expiration date: 8/11/2024  Treatment plan discussed with: patient    PT Pelvic Floor Subjective:   History of Present Illness:   Patient reports that she had pain with use of tampons or attempted penetration with her BF. She has been unable to have full penetration with him, feels he is a very supportive partner.     2-3 hours per day of horseback riding - she rides dressage \"ballet of horseback riding.\"     Attempted to use a tampon last year as wearing menstrual pads     Biologic may be contributor to yeast infections. Kelvin is well managed with medication and diet and lifestyle per patient report. She started medication last May. If she feels a flare it feels like very strong gripping in her abdomen. She has been hospitalized in 2022 due to an inflammatory blockage of the ilium. She was diagnosed with Chrons around 7 years of age.     \"I kind of have a hinge point in my lumbar spine due to the constant compression in my lumbar spine and thighs.\" She sees a " "chiropractor one time a week currently; massage is about every 2 weeks and acupuncture monthly.          Recurrent probem    Quality of life: good    Social Support:     Lives in:  Apartment    Lives with:  Alone    Employment status: student - online classes; works at [employer] daily.  Diet and Exercise:      [place] 3 and Pilates in addition to riding   OB/ gyn History    Gestational History:     Prior Pregnancy: No      Menstrual History:    Date of last menstrual period: 5/13/2024    Menstrual irregularities regular menses    Painful periods:  Difficulty managing menstrual pain (abdoinal and lower back pain first 2 days which is managed with Motrin)    Tolerates tampons: yes (\"on and off\" she uses them)    Menopausal: no menopause    Birth control: no contraception  no hormone replacement therapy  Bladder Function:      Voiding Difficulties comments:     Voiding frequency: every 3-4 hours    Urinary leakage: no urine leakage    Nocturia (episodes per night): 1 and 0    Painful urination: No      Intake (ounces): Water intake (oz): 50-60 ounces. Coffee intake (oz): occasional. Tea intake (oz): occasional.     Intake (ounces) comment: Aloe water - occasionally   Bowel Function:     Voiding DIfficulties: constipation      Bowel Function comments:  Occasionally takes a laxative   Takes supplement called Beatris to help with regularity   Also takes Clearstem to help with hormonal acne    Burns Stool Scale: type 3 and type 2    Stool softener use: no stool softeners    Uses \"squatty potty\": no Squatty Potty  Sexual Function:     Sexually Active:  Sexually active    Pain during intercourse: Yes      Lubrication use during intercourse: sometimes.    pain causes abstinence    Patient wishes to return to having intercourse: currently unable to have intercourse but wants to  Diagnostic Tests:     None    Treatments:     None    Patient Goals:     Patient goals for therapy:  Improved pain management, improved quality of life, " relaxation, improved comfort, improved bladder or bowel function, fully empty bladder or bowels and decreased pain      Objective       Abdominal Assessment:      Abdominal Assessment: na      General Perineum Exam:   perineum intact.     General perineum exam comments: No discharge, irritation, organ prolapse or skin breakdown evident    Layer 1: no pain or tenderness, tone wnl  Layer 2: no pain, mildly increased tone along R  Layer 3: mild pain along periurethral tissue on R (1/10) and L (1-2/10) and L lateral IC and OI (2/10)    Patient demonstrates weak concentric lift but poor relaxation initially but with both verbal and manual cuing, she was able to volitionally relax about 50% with focus    Visual Inspection of Perineum:   Excursion of perineal body in cephalad direction with contraction of pelvic floor muscles (PFM): delayed  and weak  Excursion of perineal body in caudal direction with relaxation of pelvic floor muscles (PFM): delayed , unable and weak  Involuntary relaxation with bearing down: no  Cotton swab test: non-tender  Sphincter Tone Resting: increased  Sphincter Tone Squeeze: weak  Sensation: intact    Pelvic Organ Prolapse   no pelvic organ prolapse  Perineal body inspection: elevated        Pelvic Floor Muscle Exam:       Pelvic floor muscle relaxation is delayed and incomplete.   50% pelvic floor relaxation        PERFECT Score   Power right: 1+/5   Power left: 1+/5   Endurance (seconds to max): 5   Repetitions (before fatigue): 3        pelvic floor exam consent given by patient    Pelvic exam completed: vaginally     SMEG Biofeedback   to be assessed next treatment           Precautions:   Patient Active Problem List   Diagnosis   • Crohn's disease (HCC)   • Vitamin D deficiency   • Port wine stain   • Painful and cold lower extremity   • Anemia       PRO EVAL RE-EVAL DISCHARGE   PFDI      JULIO-18      VPQ 30.3     CPSI-NIH      PGQ          POC Expires Auth Status Start Date Exp Date PT  Visit Limit DA expires DA provider   8/11               Date of Service 5/20           Visits Used            Visits Remaining                        Manuals                                                            Neuro Re-Ed                                                                                                Ther Ex                                                                                    Ther Activity            Education Anatomy and POC                                                                        Modalities

## 2024-05-22 ENCOUNTER — APPOINTMENT (OUTPATIENT)
Dept: PHYSICAL THERAPY | Facility: REHABILITATION | Age: 19
End: 2024-05-22
Payer: COMMERCIAL

## 2024-05-28 ENCOUNTER — OFFICE VISIT (OUTPATIENT)
Dept: PHYSICAL THERAPY | Facility: REHABILITATION | Age: 19
End: 2024-05-28
Payer: COMMERCIAL

## 2024-05-28 DIAGNOSIS — M62.89 PELVIC FLOOR TENSION: ICD-10-CM

## 2024-05-28 DIAGNOSIS — N94.2 VAGINISMUS: Primary | ICD-10-CM

## 2024-05-28 LAB
ALBUMIN SERPL-MCNC: 4.6 G/DL (ref 3.5–5.7)
ALP SERPL-CCNC: 37 U/L (ref 35–120)
ALT SERPL-CCNC: 11 U/L
ANION GAP SERPL CALCULATED.3IONS-SCNC: 7 MMOL/L (ref 3–11)
AST SERPL-CCNC: 15 U/L
BASOPHILS # BLD AUTO: 0.1 THOU/CMM (ref 0–0.1)
BASOPHILS NFR BLD AUTO: 1 %
BILIRUB SERPL-MCNC: 0.4 MG/DL (ref 0.2–1)
BUN SERPL-MCNC: 9 MG/DL (ref 7–25)
CALCIUM SERPL-MCNC: 9.3 MG/DL (ref 8.5–10.1)
CHLORIDE SERPL-SCNC: 107 MMOL/L (ref 100–109)
CO2 SERPL-SCNC: 26 MMOL/L (ref 21–31)
CREAT SERPL-MCNC: 0.6 MG/DL (ref 0.4–1.1)
CRP SERPL-MCNC: <1 MG/L
CYTOLOGY CMNT CVX/VAG CYTO-IMP: NORMAL
DIFFERENTIAL METHOD BLD: NORMAL
EOSINOPHIL # BLD AUTO: 0.4 THOU/CMM (ref 0–0.5)
EOSINOPHIL NFR BLD AUTO: 7 %
ERYTHROCYTE [DISTWIDTH] IN BLOOD BY AUTOMATED COUNT: 12.5 % (ref 12–16)
GFR/BSA.PRED SERPLBLD CYS-BASED-ARV: 131 ML/MIN/{1.73_M2}
GLUCOSE SERPL-MCNC: 91 MG/DL (ref 65–99)
HCT VFR BLD AUTO: 36.9 % (ref 35–43)
HGB BLD-MCNC: 12.5 G/DL (ref 11.5–14.5)
LYMPHOCYTES # BLD AUTO: 1.8 THOU/CMM (ref 1–3)
LYMPHOCYTES NFR BLD AUTO: 33 %
MCH RBC QN AUTO: 31.8 PG (ref 26–34)
MCHC RBC AUTO-ENTMCNC: 33.9 G/DL (ref 32–37)
MCV RBC AUTO: 94 FL (ref 80–100)
MONOCYTES # BLD AUTO: 0.5 THOU/CMM (ref 0.3–1)
MONOCYTES NFR BLD AUTO: 9 %
NEUTROPHILS # BLD AUTO: 2.9 THOU/CMM (ref 1.8–7.8)
NEUTROPHILS NFR BLD AUTO: 50 %
PLATELET # BLD AUTO: 240 THOU/CMM (ref 140–350)
PMV BLD REES-ECKER: 8.4 FL (ref 7.5–11.3)
POTASSIUM SERPL-SCNC: 4.6 MMOL/L (ref 3.5–5.2)
PROT SERPL-MCNC: 7.5 G/DL (ref 6.3–8.3)
RBC # BLD AUTO: 3.94 MILL/CMM (ref 3.7–4.7)
SODIUM SERPL-SCNC: 140 MMOL/L (ref 135–145)
WBC # BLD AUTO: 5.7 THOU/CMM (ref 4–10)

## 2024-05-28 PROCEDURE — 97112 NEUROMUSCULAR REEDUCATION: CPT | Performed by: PHYSICAL THERAPIST

## 2024-05-28 NOTE — PROGRESS NOTES
Daily Note     Today's date: 2024  Patient name: Harry Corley  : 2005  MRN: [id number]  Referring provider: Jes Yao P*  Dx:   Encounter Diagnosis     ICD-10-CM    1. Vaginismus  N94.2       2. Pelvic floor tension  M62.89           Harry Corley is a 18 y.o. female who presents with concerns of difficulty using tampons, tolerating exams or tolerating penetration with her partner. 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 Examination reveals hypertonicity along her entire levator ani, especially deep layer 3, with poor strength and holding endurance and poor inability to release and relax her muscles. Mild tenderness throughout although she was able to tolerate full digital palpation.              Subjective: Patient offers no changes since IE although she did hit her perineum along her horse saddle this morning.       Objective: See treatment diary below      Assessment: Tolerated treatment well. Patient would benefit from continued PT. BF used for entire session and she demonstrated good ability to quiet PFM to wnl initially but had trouble following active movement. Good co-contraction with hip flexion, adduction and upper abdominals but less co-contraction with lower abdominals. Discussed fascial connection b/w adductors and PFM and how there may be a connection.     Issued HEP of 5 second holds, 10 seconds of rest to perform 10 times in supine or sitting  position with emphasis on relaxation phase. This is to be performed 3 times per day.         Plan: Continue per plan of care. NV - adductor release, breath training, PFM release. Pt aware.      Precautions:   Patient Active Problem List   Diagnosis    Crohn's disease (HCC)    Vitamin D deficiency    Port wine stain    Painful and cold lower extremity    Anemia       PRO EVAL RE-EVAL DISCHARGE   PFDI      JULIO-18      VPQ 30.3     CPSI-NIH      PGQ          POC Expires Auth Status Start Date Exp Date  PT Visit Limit DA expires DA provider   8/11               Date of Service 5/20 5/28          Visits Used            Visits Remaining                        Manuals                                                            Neuro Re-Ed            BF  55'                                                                                  Ther Ex                                                                                    Ther Activity            Education Anatomy and POC                                                                        Modalities

## 2024-05-29 ENCOUNTER — APPOINTMENT (OUTPATIENT)
Dept: PHYSICAL THERAPY | Facility: REHABILITATION | Age: 19
End: 2024-05-29
Payer: COMMERCIAL

## 2024-05-29 LAB
25(OH)D3+25(OH)D2 SERPL-MCNC: 29 NG/ML (ref 30–100)
FERRITIN SERPL-MCNC: 121 NG/ML (ref 11–306.8)
GAMMA INTERFERON BACKGROUND BLD IA-ACNC: 0.04 IU/ML
IRON SATN MFR SERPL: 25 % (ref 20–50)
IRON SERPL-MCNC: 74 UG/DL (ref 50–212)
M TB IFN-G BLD-IMP: NEGATIVE
M TB IFN-G CD4+ BCKGRND COR BLD-ACNC: 0 IU/ML
M TB IFN-G CD4+ BCKGRND COR BLD-ACNC: 0 IU/ML
MITOGEN IGNF BLD-ACNC: 1.51 IU/ML
QUANTIFERON INCUBATION COMMENT: NORMAL
TIBC SERPL-MCNC: 295 UG/DL (ref 260–430)
TRANSFERRIN SERPL-MCNC: 211 MG/DL (ref 203–362)

## 2024-05-30 ENCOUNTER — APPOINTMENT (OUTPATIENT)
Dept: PHYSICAL THERAPY | Facility: REHABILITATION | Age: 19
End: 2024-05-30
Payer: COMMERCIAL

## 2024-06-03 ENCOUNTER — OFFICE VISIT (OUTPATIENT)
Dept: GASTROENTEROLOGY | Facility: CLINIC | Age: 19
End: 2024-06-03
Payer: COMMERCIAL

## 2024-06-03 VITALS
DIASTOLIC BLOOD PRESSURE: 63 MMHG | HEART RATE: 69 BPM | BODY MASS INDEX: 17.74 KG/M2 | TEMPERATURE: 98 F | SYSTOLIC BLOOD PRESSURE: 100 MMHG | WEIGHT: 113 LBS | HEIGHT: 67 IN

## 2024-06-03 DIAGNOSIS — D84.9 IMMUNOCOMPROMISED STATE (HCC): ICD-10-CM

## 2024-06-03 DIAGNOSIS — K50.00 CROHN'S DISEASE OF SMALL INTESTINE WITHOUT COMPLICATION (HCC): Primary | ICD-10-CM

## 2024-06-03 DIAGNOSIS — E55.9 VITAMIN D DEFICIENCY: ICD-10-CM

## 2024-06-03 DIAGNOSIS — K59.09 OTHER CONSTIPATION: ICD-10-CM

## 2024-06-03 PROCEDURE — 99214 OFFICE O/P EST MOD 30 MIN: CPT | Performed by: INTERNAL MEDICINE

## 2024-06-03 NOTE — PROGRESS NOTES
[facility] - Outpatient Follow-up Note  Harry Corley 18 y.o. female MRN: [id number]  Encounter: [id number]          ASSESSMENT AND PLAN:    Harry Corley is a 18 y.o. female with Crohn's disease of the small bowel with symptom onset 2013 diagnosis 2015, and multiple episodes of small bowel obstructions now maintained on Skyrizi and previously on Pentasa for several years as well as subsequently weekly methotrexate and Entocort now presents for follow-up. She has been having some constipation, occasionally poor appetite.     MR enterography from March 2023 with a 1.2 cm inflammatory stricture without any upstream dilatation.  Colonoscopy March 2023 with fair prep and simple endoscopic score for Crohn's disease 9 with ileitis and a 4 mm polyp.    Recent blood work from May included normal CMP, CBC, CRP, QuantiFERON    1. Crohn's disease of small intestine without complication (HCC)    2. Vitamin D deficiency    3. Immunocompromised state (HCC)    4. Other constipation        No orders of the defined types were placed in this encounter.    Benefiber 1-2 times per day.  Drink at least 8 cups of water per day  Continue Skyrizi  Awaiting upcoming MR enterography  Blood work 4 months  Next quant gold and hepatitis panel May 2025  Next colonoscopy TBD  1000 units of OTC vitamin D3    Avoid live virus vaccines  Yearly flu shot  COVID vaccine and booster  Pneumonia vaccine  Shingrix  Routine skin exams with the dermatologist    ______________________________________________________________________    SUBJECTIVE:    Harry Corley is a 18 y.o. female who presents with complaint of Crohn's.     Overall she feels well. She struggles with constipation. She takes fiber. Some days it is better but last week 3 days she felt constipated and backed up. She took a laxative and it helps. She was given a stool softener and sennakot. She does not take too much fiber. N blood in the stool or black stools. No  "weight loss. Appetite is mostly good. Some fatigue. Sometimes when stressed it feels tight. She takes 2 gas-x and a zofran and it helps. Sometimes she is hungry but can't eat. She has a lot of stress.   The Skyrizi is going okay. No more vagal responses.     REVIEW OF SYSTEMS IS OTHERWISE NEGATIVE.  10 point ROS reviewed and negative, except as above      Historical Information   Past Medical History:   Diagnosis Date    Abnormal weight loss     last assessed: 1/15/15    Anemia     resolved: 12/10/15    Asthma     Calcaneal apophysitis     left; lasts assessed: 2/17/14    Crohn's disease (HCC)     Mass of breast, left     last assessed: 6/9/15    Unspecified subluxation of left patella, initial encounter     last assessed: 2/2/16    Vitamin D deficiency      Past Surgical History:   Procedure Laterality Date    COLONOSCOPY      ESOPHAGOGASTRODUODENOSCOPY      PORT WINE STAIN REMOVAL W/ LASER       Social History   Social History     Substance and Sexual Activity   Alcohol Use No     Social History     Substance and Sexual Activity   Drug Use No     Social History     Tobacco Use   Smoking Status Never   Smokeless Tobacco Never     Family History   Problem Relation Age of Onset    Hypothyroidism Mother     No Known Problems Father     Hypothyroidism Maternal Grandmother     Diabetes Family     Cancer Family     ROJELIO disease Family     Allergies Family     Colon cancer Other        Meds/Allergies       Current Outpatient Medications:     Cholecalciferol (VITAMIN D3) 2000 units capsule    diphenhydramine, lidocaine, Al/Mg hydroxide, simethicone (Magic Mouthwash) SUSP    miconazole (MONISTAT-7) 2 % vaginal cream    ondansetron (ZOFRAN-ODT) 4 mg disintegrating tablet    risankizumab-rzaa (Skyrizi) 360 MG/2.4ML SOCT    Allergies   Allergen Reactions    Gluten Meal - Food Allergy     Seasonal Ic [Cholestatin]     Tilactase            Objective     Blood pressure 100/63, pulse 69, temperature 98 °F (36.7 °C), height 5' 7\" " (1.702 m), weight 51.3 kg (113 lb). Body mass index is 17.7 kg/m².    PHYSICAL EXAMINATION:    General Appearance:   Alert, cooperative, no distress   HEENT:  Normocephalic, atraumatic, anicteric. Neck supple, symmetrical, trachea midline.   Lungs:   Equal chest rise and unlabored breathing, normal effort, no coughing.   Cardiovascular:   No visualized JVD.   Abdomen:   No abdominal distension.   Skin:   No jaundice, rashes, or lesions.    Musculoskeletal:   Normal range of motion visualized.   Psych:  Normal affect and normal insight.   Neuro:  Alert and appropriate.         Lab Results:   No visits with results within 1 Day(s) from this visit.   Latest known visit with results is:   Orders Only on 05/24/2024   Component Date Value    Ferritin 05/24/2024 121.0     QuantiFERON TB Gold 05/24/2024 Negative     TB1-NIL 05/24/2024 0.00     TB2-NIL 05/24/2024 0.00     QuantiFERON Nil Value 05/24/2024 0.04     QuantiFERON Mitogen value 05/24/2024 1.51     Quantiferon Incubation C* 05/24/2024 (Note)     Iron, Serum 05/24/2024 74     Transferrin 05/24/2024 211     Iron Saturation 05/24/2024 25     Total Iron Binding Correll* 05/24/2024 295     25-HYDROXY VIT D 05/24/2024 29 (L)        Lab Results   Component Value Date    WBC 8.54 12/30/2020    HGB 13.6 12/30/2020    HCT 42.6 12/30/2020    MCV 92 12/30/2020     12/30/2020       Lab Results   Component Value Date     12/02/2015    SODIUM 137 12/30/2020    K 4.2 12/30/2020     12/30/2020    CO2 27 12/30/2020    ANIONGAP 8 12/02/2015    AGAP 4 12/30/2020    BUN 7 12/30/2020    CREATININE 0.63 12/30/2020    GLUC 75 12/09/2016    GLUF 88 12/30/2020    CALCIUM 9.8 12/30/2020    AST 15 12/30/2020    ALT 19 12/30/2020    ALKPHOS 59 12/30/2020    PROT 7.1 12/02/2015    TP 8.3 (H) 12/30/2020    BILITOT 0.38 12/02/2015    TBILI 0.61 12/30/2020    EGFR  03/01/2016      Comment:      eGFR calculation is only valid for adults 18 years and older.       Lab Results    Component Value Date    CRP <3.0 07/28/2020       Lab Results   Component Value Date    TPN5ZXPUVIVM 0.698 12/30/2020       Lab Results   Component Value Date    IRON 74 05/24/2024    TIBC 295 05/24/2024    FERRITIN 121.0 05/24/2024       Radiology Results:   No results found.

## 2024-06-05 ENCOUNTER — NURSE TRIAGE (OUTPATIENT)
Age: 19
End: 2024-06-05

## 2024-06-05 ENCOUNTER — TELEPHONE (OUTPATIENT)
Age: 19
End: 2024-06-05

## 2024-06-05 ENCOUNTER — APPOINTMENT (OUTPATIENT)
Dept: PHYSICAL THERAPY | Facility: REHABILITATION | Age: 19
End: 2024-06-05
Payer: COMMERCIAL

## 2024-06-05 DIAGNOSIS — K50.00 CROHN'S DISEASE OF SMALL INTESTINE WITHOUT COMPLICATION (HCC): Primary | ICD-10-CM

## 2024-06-05 NOTE — TELEPHONE ENCOUNTER
Garima Schmid from Prior Auth team  [phone] or Teams    Patient's MRI is authorized however insurance needs peer to peer for Enterography portion      STUDY SCHEDULED FOR 06-06-24 STAT CPT: 57176 [id number] OK TO PROCEED  Location: ([place]) - [place]  PARTIAL APPROVAL: CPT:75916 APPROVED AUTH#[id number] VALID: 06-05-24 TO 09-02-24 CPT:05071 PENDING P2P CASE#[id number] PHYSICIAN TO CALL DOUG@[phone]

## 2024-06-05 NOTE — TELEPHONE ENCOUNTER
"Reason for Disposition   Information only question and nurse able to answer    Answer Assessment - Initial Assessment Questions  1. REASON FOR CALL or QUESTION: \"What is your reason for calling today?\" or \"How can I best help you?\" or \"What question do you have that I can help answer?\"          Pt. Mother calling to see if MRI order was placed, advised that MRI order was placed and scheduled for tomorrow, pt. Will call Rocío later today for update on insurance auth    Protocols used: Information Only Call - No Triage-[place]ULT-[place]    "

## 2024-06-05 NOTE — TELEPHONE ENCOUNTER
" Pt is scheduled for MRI enterography 5/6/24. Order was placed by [place] physician, authorization not able to be obtained. Can you place order for stat Mri enterography so pt can keep her appointment tomorrow, and our authorization team can get authorization? Thank you.          Reason for Disposition   Information only question and nurse able to answer    Answer Assessment - Initial Assessment Questions  1. REASON FOR CALL or QUESTION: \"What is your reason for calling today?\" or \"How can I best help you?\" or \"What question do you have that I can help answer?\"      Pt is scheduled for MRI enterography 5/6/24. Order was place by [place] physician, authorization not able to be obtained. Can you place order for stat Mri enterography so pt can keep her appointment tomorrow, and our authorization team can get authorization? Thank you.    Protocols used: Information Only Call - No Triage-ADULT-[place]    "

## 2024-06-06 ENCOUNTER — HOSPITAL ENCOUNTER (OUTPATIENT)
Dept: MRI IMAGING | Facility: HOSPITAL | Age: 19
End: 2024-06-06
Payer: COMMERCIAL

## 2024-06-06 ENCOUNTER — APPOINTMENT (OUTPATIENT)
Dept: PHYSICAL THERAPY | Facility: REHABILITATION | Age: 19
End: 2024-06-06
Payer: COMMERCIAL

## 2024-06-06 DIAGNOSIS — K50.00 CROHN'S DISEASE OF SMALL INTESTINE WITHOUT COMPLICATION (HCC): ICD-10-CM

## 2024-06-06 PROCEDURE — 74183 MRI ABD W/O CNTR FLWD CNTR: CPT

## 2024-06-06 PROCEDURE — A9585 GADOBUTROL INJECTION: HCPCS | Performed by: STUDENT IN AN ORGANIZED HEALTH CARE EDUCATION/TRAINING PROGRAM

## 2024-06-06 PROCEDURE — 72197 MRI PELVIS W/O & W/DYE: CPT

## 2024-06-06 RX ORDER — GADOBUTROL 604.72 MG/ML
5 INJECTION INTRAVENOUS
Status: COMPLETED | OUTPATIENT
Start: 2024-06-06 | End: 2024-06-06

## 2024-06-06 RX ADMIN — GADOBUTROL 5 ML: 604.72 INJECTION INTRAVENOUS at 09:06

## 2024-06-06 NOTE — PROGRESS NOTES
Daily Note     Today's date: 2024  Patient name: Harry Corley  : 2005  MRN: [id number]  Referring provider: Jes Yao P*  Dx:   Encounter Diagnosis     ICD-10-CM    1. Vaginismus  N94.2       2. Pelvic floor tension  M62.89           Harry Corley is a 18 y.o. female who presents with concerns of difficulty using tampons, tolerating exams or tolerating penetration with her partner. 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 Examination reveals hypertonicity along her entire levator ani, especially deep layer 3, with poor strength and holding endurance and poor inability to release and relax her muscles. Mild tenderness throughout although she was able to tolerate full digital palpation.              Subjective: Patient offers no changes since IE although she did hit her perineum along her horse saddle this morning.       Objective: See treatment diary below      Assessment: Tolerated treatment well. Patient would benefit from continued PT. BF used for entire session and she demonstrated good ability to quiet PFM to wnl initially but had trouble following active movement. Good co-contraction with hip flexion, adduction and upper abdominals but less co-contraction with lower abdominals. Discussed fascial connection b/w adductors and PFM and how there may be a connection.     Issued HEP of 5 second holds, 10 seconds of rest to perform 10 times in supine or sitting  position with emphasis on relaxation phase. This is to be performed 3 times per day.         Plan: Continue per plan of care. NV - adductor release, breath training, PFM release. Pt aware.      Precautions:   Patient Active Problem List   Diagnosis    Crohn's disease (HCC)    Vitamin D deficiency    Port wine stain    Painful and cold lower extremity    Anemia       PRO EVAL RE-EVAL DISCHARGE   PFDI      JULIO-18      VPQ 30.3     CPSI-NIH      PGQ          POC Expires Auth Status Start Date Exp Date  PT Visit Limit DA expires DA provider   8/11               Date of Service 5/20 5/28          Visits Used            Visits Remaining                        Manuals                                                            Neuro Re-Ed            BF  55'                                                                                  Ther Ex                                                                                    Ther Activity            Education Anatomy and POC                                                                        Modalities

## 2024-06-10 ENCOUNTER — APPOINTMENT (OUTPATIENT)
Dept: PHYSICAL THERAPY | Facility: REHABILITATION | Age: 19
End: 2024-06-10
Payer: COMMERCIAL

## 2024-06-11 ENCOUNTER — OFFICE VISIT (OUTPATIENT)
Dept: PHYSICAL THERAPY | Facility: REHABILITATION | Age: 19
End: 2024-06-11
Payer: COMMERCIAL

## 2024-06-11 DIAGNOSIS — M62.89 PELVIC FLOOR TENSION: ICD-10-CM

## 2024-06-11 DIAGNOSIS — N94.2 VAGINISMUS: Primary | ICD-10-CM

## 2024-06-11 PROCEDURE — 97112 NEUROMUSCULAR REEDUCATION: CPT

## 2024-06-11 PROCEDURE — 97140 MANUAL THERAPY 1/> REGIONS: CPT

## 2024-06-11 NOTE — PROGRESS NOTES
Daily Note     Today's date: 2024  Patient name: Harry Corley  : 2005  MRN: [id number]  Referring provider: Jes Yao P*  Dx:   Encounter Diagnosis     ICD-10-CM    1. Vaginismus  N94.2       2. Pelvic floor tension  M62.89             Harry Corley is a 18 y.o. female who presents with concerns of difficulty using tampons, tolerating exams or tolerating penetration with her partner. 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 Examination reveals hypertonicity along her entire levator ani, especially deep layer 3, with poor strength and holding endurance and poor inability to release and relax her muscles. Mild tenderness throughout although she was able to tolerate full digital palpation.   Start Time: 1715  Stop Time: 1800  Total time in clinic (min): 45 minutes    Subjective: Challenged with the awareness to relax      Objective: See treatment diary below      Assessment: Tolerated treatment well. Patient would benefit from continued PT. As planned we introduced adductor release, breath training, PFM release and pelvic elevators.  Some discomfort reproduced but did not identify it as pain.  By the end of the session able to perform pelvic elevators with improved ease.     Plan: Continue per plan of care.      Precautions:   Patient Active Problem List   Diagnosis    Crohn's disease (HCC)    Vitamin D deficiency    Port wine stain    Painful and cold lower extremity    Anemia       PRO EVAL RE-EVAL DISCHARGE   PFDI      JULIO-18      VPQ 30.3     CPSI-NIH      PGQ          POC Expires Auth Status Start Date Exp Date PT Visit Limit DA expires DA provider                  Date of Service          Visits Used            Visits Remaining                        Manuals                        PFM stretching   20'         Adductor release (cupping)   15'                     Neuro Re-Ed            BF  55'          C/R/ bear down   3'         Pelvic  elevators   10x         DB   5'                                             Ther Ex                                                                                    Ther Activity            Education Anatomy and POC                                                                        Modalities

## 2024-06-18 NOTE — PROGRESS NOTES
Daily Note     Today's date: 2024  Patient name: Harry Corley  : 2005  MRN: [id number]  Referring provider: Jes Yao P*  Dx:   Encounter Diagnosis     ICD-10-CM    1. Vaginismus  N94.2       2. Pelvic floor tension  M62.89             Harry Corley is a 18 y.o. female who presents with concerns of difficulty using tampons, tolerating exams or tolerating penetration with her partner. 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 Examination reveals hypertonicity along her entire levator ani, especially deep layer 3, with poor strength and holding endurance and poor inability to release and relax her muscles. Mild tenderness throughout although she was able to tolerate full digital palpation.              Subjective: Challenged with the awareness to relax      Objective: See treatment diary below      Assessment: Tolerated treatment well. Patient would benefit from continued PT. As planned we introduced adductor release, breath training, PFM release and pelvic elevators.  Some discomfort reproduced but did not identify it as pain.  By the end of the session able to perform pelvic elevators with improved ease.     Plan: Continue per plan of care.      Precautions:   Patient Active Problem List   Diagnosis    Crohn's disease (HCC)    Vitamin D deficiency    Port wine stain    Painful and cold lower extremity    Anemia       PRO EVAL RE-EVAL DISCHARGE   PFDI      JULIO-18      VPQ 30.3     CPSI-NIH      PGQ          POC Expires Auth Status Start Date Exp Date PT Visit Limit DA expires DA provider                  Date of Service          Visits Used            Visits Remaining                        Manuals                        PFM stretching   20'         Adductor release (cupping)   15'                     Neuro Re-Ed            BF  55'          C/R/ bear down   3'         Pelvic elevators   10x         DB   5'                                              Ther Ex                                                                                    Ther Activity            Education Anatomy and POC                                                                        Modalities

## 2024-06-19 ENCOUNTER — APPOINTMENT (OUTPATIENT)
Dept: PHYSICAL THERAPY | Facility: REHABILITATION | Age: 19
End: 2024-06-19
Payer: COMMERCIAL

## 2024-06-19 DIAGNOSIS — M62.89 PELVIC FLOOR TENSION: ICD-10-CM

## 2024-06-19 DIAGNOSIS — N94.2 VAGINISMUS: Primary | ICD-10-CM

## 2024-06-26 ENCOUNTER — APPOINTMENT (OUTPATIENT)
Dept: PHYSICAL THERAPY | Facility: REHABILITATION | Age: 19
End: 2024-06-26
Payer: COMMERCIAL

## 2024-06-27 ENCOUNTER — OFFICE VISIT (OUTPATIENT)
Dept: PHYSICAL THERAPY | Facility: REHABILITATION | Age: 19
End: 2024-06-27
Payer: COMMERCIAL

## 2024-06-27 DIAGNOSIS — M62.89 PELVIC FLOOR TENSION: ICD-10-CM

## 2024-06-27 DIAGNOSIS — N94.2 VAGINISMUS: Primary | ICD-10-CM

## 2024-06-27 PROCEDURE — 97140 MANUAL THERAPY 1/> REGIONS: CPT

## 2024-06-27 NOTE — PROGRESS NOTES
Daily Note     Today's date: 2024  Patient name: Harry Corley  : 2005  MRN: [id number]  Referring provider: Jes Yao P*  Dx:   Encounter Diagnosis     ICD-10-CM    1. Vaginismus  N94.2       2. Pelvic floor tension  M62.89             Harry Corley is a 18 y.o. female who presents with concerns of difficulty using tampons, tolerating exams or tolerating penetration with her partner. 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 Examination reveals hypertonicity along her entire levator ani, especially deep layer 3, with poor strength and holding endurance and poor inability to release and relax her muscles. Mild tenderness throughout although she was able to tolerate full digital palpation.   Start Time: 1720  Stop Time: 1800  Total time in clinic (min): 40 minutes    Subjective: Pt was able to use a tampon last week.    Objective: See treatment diary below      Assessment: Tolerated treatment well. Patient would benefit from continued PT. Improved ability with initial penetration but then does experience some discomfort.  With cueing of breathing and contract and relax, able to insert fully my index with no discomfort.  Discussed using her vibrator (she already owns one) the same way with gentle vibration as she feels that's soothing. Scheduled to see primary PT on Monday, and her boyfriend is visiting in a few weeks for a week.     Plan: Continue per plan of care.      Precautions:   Patient Active Problem List   Diagnosis    Crohn's disease (HCC)    Vitamin D deficiency    Port wine stain    Painful and cold lower extremity    Anemia       PRO EVAL RE-EVAL DISCHARGE   PFDI      JULIO-18      VPQ 30.3     CPSI-NIH      PGQ          POC Expires Auth Status Start Date Exp Date PT Visit Limit DA expires DA provider                  Date of Service         Visits Used            Visits Remaining                        Manuals            C/r  & bear down    10        PFM stretching   20' 20'        Adductor release (cupping)   15' 10'                    Neuro Re-Ed            BF  55'          C/R/ bear down   3'         Pelvic elevators   10x         DB   5'                                             Ther Ex                                                                                    Ther Activity            Education Anatomy and POC                                                                        Modalities

## 2024-06-30 NOTE — PROGRESS NOTES
"Daily Note     Today's date: 2024  Patient name: Harry Corley  : 2005  MRN: [id number]  Referring provider: Jes Yao P*  Dx:   Encounter Diagnosis     ICD-10-CM    1. Vaginismus  N94.2       2. Pelvic floor tension  M62.89             Harry Corley is a 18 y.o. female who presents with concerns of difficulty using tampons, tolerating exams or tolerating penetration with her partner. 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 Examination reveals hypertonicity along her entire levator ani, especially deep layer 3, with poor strength and holding endurance and poor inability to release and relax her muscles. Mild tenderness throughout although she was able to tolerate full digital palpation.   Start Time: 1605          Subjective: Pt under stress due to school and horses and she feels like her Chrohn's has been worsening and she feels inflammed and more constipated. She has lost her appetite. She feels like she has \"poop cramp\" along her transverse abdominus.    Objective: See treatment diary below      Assessment: Tolerated treatment well. Patient would benefit from continued PT. VM with restriction noted along TC, splenic flexure and cephalad aspect of DC. This improved nicely with MT and she was encouraged to hydrate well over next few days. Was able to tolerate stretching of all 3 layers of the pelvic floor with slow controlled movement at start of session. Good concentric and eccentric control although her resting baseline remains elevated. Boyfriend is visiting in a few weeks for a week.     Plan: Continue per plan of care.      Precautions:   Patient Active Problem List   Diagnosis    Crohn's disease (HCC)    Vitamin D deficiency    Port wine stain    Painful and cold lower extremity    Anemia       PRO EVAL RE-EVAL DISCHARGE   PFDI      JULIO-18      VPQ 30.3     CPSI-NIH      PGQ          POC Expires Auth Status Start Date Exp Date PT Visit Limit DA " expires DA provider   8/11               Date of Service 5/20 5/28 6/11 6/27 7/1       Visits Used            Visits Remaining                        Manuals            C/r & bear down    10 5'       PFM stretching   20' 20' 20'       Adductor release (cupping)   15' 10' nv **      VM     As charted x30'                   Neuro Re-Ed            BF  55'          C/R/ bear down   3'         Pelvic elevators   10x         DB   5'                                             Ther Ex                                                                                    Ther Activity            Education Anatomy and POC                                                                        Modalities

## 2024-07-01 ENCOUNTER — OFFICE VISIT (OUTPATIENT)
Dept: PHYSICAL THERAPY | Facility: REHABILITATION | Age: 19
End: 2024-07-01
Payer: COMMERCIAL

## 2024-07-01 DIAGNOSIS — M62.89 PELVIC FLOOR TENSION: ICD-10-CM

## 2024-07-01 DIAGNOSIS — N94.2 VAGINISMUS: Primary | ICD-10-CM

## 2024-07-01 PROCEDURE — 97140 MANUAL THERAPY 1/> REGIONS: CPT | Performed by: PHYSICAL THERAPIST

## 2024-07-10 ENCOUNTER — OFFICE VISIT (OUTPATIENT)
Dept: PHYSICAL THERAPY | Facility: REHABILITATION | Age: 19
End: 2024-07-10
Payer: COMMERCIAL

## 2024-07-10 DIAGNOSIS — M62.89 PELVIC FLOOR TENSION: ICD-10-CM

## 2024-07-10 DIAGNOSIS — N94.2 VAGINISMUS: Primary | ICD-10-CM

## 2024-07-10 PROCEDURE — 97140 MANUAL THERAPY 1/> REGIONS: CPT

## 2024-07-10 NOTE — PROGRESS NOTES
Daily Note     Today's date: 7/10/2024  Patient name: Harry Corley  : 2005  MRN: [id number]  Referring provider: Jes Yao P*  Dx:   Encounter Diagnosis     ICD-10-CM    1. Vaginismus  N94.2       2. Pelvic floor tension  M62.89             Harry Corley is a 18 y.o. female who presents with concerns of difficulty using tampons, tolerating exams or tolerating penetration with her partner. 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 Examination reveals hypertonicity along her entire levator ani, especially deep layer 3, with poor strength and holding endurance and poor inability to release and relax her muscles. Mild tenderness throughout although she was able to tolerate full digital palpation.   Start Time: 1515  Stop Time: 1610  Total time in clinic (min): 55 minutes    Subjective: Feels her symptoms are better than last week and also feels the strategy to contract and relax helps when she is doing her self manuals.  Objective: See treatment diary below      Assessment: Tolerated treatment well. Patient would benefit from continued PT. Did well with fascia decompression over lower abdomen.  Following IADTM of b/l adductors, pt was agreeable to PFM stretching.  Initial penetration was more tender but quickly resolved.  Able to stretch all 3 layers and also able to tolerate 2 stacked finger stretch.  Boyfriend is visiting in a few weeks for a week.     Plan: Continue per plan of care.      Precautions:   Patient Active Problem List   Diagnosis    Crohn's disease (HCC)    Vitamin D deficiency    Port wine stain    Painful and cold lower extremity    Anemia       PRO EVAL RE-EVAL DISCHARGE   PFDI      JULIO-18      VPQ 30.3     CPSI-NIH      PGQ          POC Expires Auth Status Start Date Exp Date PT Visit Limit DA expires DA provider                  Date of Service 5/20 5/28 6/11 6/27 7/1 7/10      Visits Used            Visits Remaining                         Manuals            C/r & bear down    10 5' 10'      PFM stretching   20' 20' 20' 20' w/ 1& 2 stacked fingers      Adductor release (cupping)   15' 10' nv 15'      VM     As charted x30' Bowel massage & lower abdominal w/ cupping 10'                  Neuro Re-Ed            BF  55'          C/R/ bear down   3'         Pelvic elevators   10x         DB   5'                                             Ther Ex                                                                                    Ther Activity            Education Anatomy and POC                                                                        Modalities

## 2024-07-13 NOTE — PROGRESS NOTES
"Daily Note     Today's date: 7/15/2024  Patient name: Harry Corley  : 2005  MRN: [id number]  Referring provider: Jes Yao P*  Dx:   Encounter Diagnosis     ICD-10-CM    1. Vaginismus  N94.2       2. Pelvic floor tension  M62.89             Harry Corley is a 18 y.o. female who presents with concerns of difficulty using tampons, tolerating exams or tolerating penetration with her partner. 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 Examination reveals hypertonicity along her entire levator ani, especially deep layer 3, with poor strength and holding endurance and poor inability to release and relax her muscles. Mild tenderness throughout although she was able to tolerate full digital palpation.              Subjective: \"The initial discomfort with stretching tends to be the worst\" which get irritated with riding. She feels tense due to stress and has knot on her left side of her neck due to poor sleeping. Feels her symptoms are better than last week and also feels the strategy to contract and relax helps when she is doing her self manuals.    Objective: See treatment diary below      Assessment: Tolerated treatment well. Patient would benefit from continued PT. VM with more restriction into lateral planes in L LQ along sigmoid. This reduced fairly well with MT. Internally, more tension (rated 4/10) on the right side which is the opposite of what used to occur with palpation. She is a few days away from menstrual cycle and we discussed that this may be why. She was encouraged to order Magnesium and was emailed power pudding recipe for constipation.     Plan: Continue per plan of care.      Precautions:   Patient Active Problem List   Diagnosis    Crohn's disease (HCC)    Vitamin D deficiency    Port wine stain    Painful and cold lower extremity    Anemia       PRO EVAL RE-EVAL DISCHARGE   PFDI      JULIO-18      VPQ 30.3     CPSI-NIH      PGQ          POC Expires " Auth Status Start Date Exp Date PT Visit Limit DA expires DA provider   8/11               Date of Service 5/20 5/28 6/11 6/27 7/1 7/10 7/15     Visits Used            Visits Remaining                        Manuals            C/r & bear down    10 5' 10'      PFM stretching   20' 20' 20' 20' w/ 1& 2 stacked fingers 15'     Adductor release (cupping)   15' 10' nv 15' 15'     VM     As charted x30' Bowel massage & lower abdominal w/ cupping 10' 15'                 Neuro Re-Ed            BF  55'          C/R/ bear down   3'         Pelvic elevators   10x         DB   5'                                             Ther Ex                                                                                    Ther Activity            Education Anatomy and POC       Bowel recommendations                                                                  Modalities

## 2024-07-15 ENCOUNTER — OFFICE VISIT (OUTPATIENT)
Dept: PHYSICAL THERAPY | Facility: REHABILITATION | Age: 19
End: 2024-07-15
Payer: COMMERCIAL

## 2024-07-15 DIAGNOSIS — M62.89 PELVIC FLOOR TENSION: ICD-10-CM

## 2024-07-15 DIAGNOSIS — N94.2 VAGINISMUS: Primary | ICD-10-CM

## 2024-07-15 PROCEDURE — 97530 THERAPEUTIC ACTIVITIES: CPT | Performed by: PHYSICAL THERAPIST

## 2024-07-15 PROCEDURE — 97140 MANUAL THERAPY 1/> REGIONS: CPT | Performed by: PHYSICAL THERAPIST

## 2024-07-30 ENCOUNTER — APPOINTMENT (OUTPATIENT)
Dept: PHYSICAL THERAPY | Facility: REHABILITATION | Age: 19
End: 2024-07-30
Payer: COMMERCIAL

## 2024-07-31 ENCOUNTER — APPOINTMENT (OUTPATIENT)
Dept: PHYSICAL THERAPY | Facility: REHABILITATION | Age: 19
End: 2024-07-31
Payer: COMMERCIAL

## 2024-08-07 ENCOUNTER — OFFICE VISIT (OUTPATIENT)
Dept: PHYSICAL THERAPY | Facility: REHABILITATION | Age: 19
End: 2024-08-07
Payer: COMMERCIAL

## 2024-08-07 DIAGNOSIS — N94.2 VAGINISMUS: Primary | ICD-10-CM

## 2024-08-07 DIAGNOSIS — M62.89 PELVIC FLOOR TENSION: ICD-10-CM

## 2024-08-07 PROCEDURE — 97140 MANUAL THERAPY 1/> REGIONS: CPT

## 2024-08-07 NOTE — PROGRESS NOTES
"Daily Note     Today's date: 2024  Patient name: Harry Corley  : 2005  MRN: [id number]  Referring provider: Jes Yao P*  Dx:   Encounter Diagnosis     ICD-10-CM    1. Vaginismus  N94.2       2. Pelvic floor tension  M62.89             Harry Corley is a 18 y.o. female who presents with concerns of difficulty using tampons, tolerating exams or tolerating penetration with her partner. 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 Examination reveals hypertonicity along her entire levator ani, especially deep layer 3, with poor strength and holding endurance and poor inability to release and relax her muscles. Mild tenderness throughout although she was able to tolerate full digital palpation.   Start Time: 1600  Stop Time: 1645  Total time in clinic (min): 45 minutes    Subjective: Was able to have her partner insert fully \"better than its ever been\". Had to stop after 1 minute due to tensing of her muscles. Is using a vibrator for self stretching, questions what kind of lubricant she should use since she is sensitive.    Objective: See treatment diary below      Assessment: Tolerated treatment well. Patient would benefit from continued PT. More discomfort & awareness with initial penetration during our session but relaxes quickly.  Able to tolerate penetration of 2 stacked fingers and a more sustained stretch.  During our session did experience tensing.  Able to identify it, with gentle contract relax able to release.  Given a few samples of our water based lubricant as she never gets irritated with our lubricant.    Plan: Continue per plan of care.      Precautions:   Patient Active Problem List   Diagnosis    Crohn's disease (HCC)    Vitamin D deficiency    Port wine stain    Painful and cold lower extremity    Anemia       PRO EVAL RE-EVAL DISCHARGE   PFDI      JULIO-18      VPQ 30.3     CPSI-NIH      PGQ          POC Expires Auth Status Start Date Exp Date PT " Visit Limit DA expires DA provider   8/11               Date of Service 5/20 5/28 6/11 6/27 7/1 7/10 7/15 8/07    Visits Used            Visits Remaining                        Manuals            C/r & bear down    10 5' 10'  20'    PFM stretching   20' 20' 20' 20' w/ 1& 2 stacked fingers 15' 25' including 1& 2 fingers    Adductor release (cupping)   15' 10' nv 15' 15'     VM     As charted x30' Bowel massage & lower abdominal w/ cupping 10' 15'                 Neuro Re-Ed            BF  55'          C/R/ bear down   3'         Pelvic elevators   10x         DB   5'                                             Ther Ex                                                                                    Ther Activity            Education Anatomy and POC       Bowel recommendations                                                                  Modalities

## 2024-08-12 ENCOUNTER — APPOINTMENT (OUTPATIENT)
Dept: PHYSICAL THERAPY | Facility: REHABILITATION | Age: 19
End: 2024-08-12
Payer: COMMERCIAL

## 2024-08-12 DIAGNOSIS — M62.89 PELVIC FLOOR TENSION: ICD-10-CM

## 2024-08-12 DIAGNOSIS — N94.2 VAGINISMUS: Primary | ICD-10-CM

## 2024-08-12 NOTE — PROGRESS NOTES
8/12/24 update: Same day cancellation for final visit. She is returning to school in [place] she she will be discharged at this time.

## 2024-08-12 NOTE — PROGRESS NOTES
"Daily Note     Today's date: 2024  Patient name: Harry Corley  : 2005  MRN: [id number]  Referring provider: Jes Yao P*  Dx:   Encounter Diagnosis     ICD-10-CM    1. Vaginismus  N94.2       2. Pelvic floor tension  M62.89             Harry Corlye is a 18 y.o. female who presents with concerns of difficulty using tampons, tolerating exams or tolerating penetration with her partner. 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 Examination reveals hypertonicity along her entire levator ani, especially deep layer 3, with poor strength and holding endurance and poor inability to release and relax her muscles. Mild tenderness throughout although she was able to tolerate full digital palpation.              Subjective: Was able to have her partner insert fully \"better than its ever been\". Had to stop after 1 minute due to tensing of her muscles. Is using a vibrator for self stretching, questions what kind of lubricant she should use since she is sensitive.    Objective: See treatment diary below      Assessment: Tolerated treatment well. Patient would benefit from continued PT. More discomfort & awareness with initial penetration during our session but relaxes quickly.  Able to tolerate penetration of 2 stacked fingers and a more sustained stretch.  During our session did experience tensing.  Able to identify it, with gentle contract relax able to release.  Given a few samples of our water based lubricant as she never gets irritated with our lubricant.    Plan: Continue per plan of care.      Precautions:   Patient Active Problem List   Diagnosis    Crohn's disease (HCC)    Vitamin D deficiency    Port wine stain    Painful and cold lower extremity    Anemia       PRO EVAL RE-EVAL DISCHARGE   PFDI      JULIO-18      VPQ 30.3     CPSI-NIH      PGQ          POC Expires Auth Status Start Date Exp Date PT Visit Limit DA expires DA provider                  Date of " Service 5/20 5/28 6/11 6/27 7/1 7/10 7/15 8/07    Visits Used            Visits Remaining                        Manuals            C/r & bear down    10 5' 10'  20'    PFM stretching   20' 20' 20' 20' w/ 1& 2 stacked fingers 15' 25' including 1& 2 fingers    Adductor release (cupping)   15' 10' nv 15' 15'     VM     As charted x30' Bowel massage & lower abdominal w/ cupping 10' 15'                 Neuro Re-Ed            BF  55'          C/R/ bear down   3'         Pelvic elevators   10x         DB   5'                                             Ther Ex                                                                                    Ther Activity            Education Anatomy and POC       Bowel recommendations                                                                  Modalities

## 2024-08-13 ENCOUNTER — NURSE TRIAGE (OUTPATIENT)
Age: 19
End: 2024-08-13

## 2024-08-13 NOTE — TELEPHONE ENCOUNTER
Pt. Mother called stating pt. Took Motrin this morning and she developed heart burn pain, pt. Has hx of chron's, pt. Mother states pt. Pointed to upper gastric area, pt. Has been having constant pain all day, took 2 tums around lunch and pt. Is not having relief, pt. Took Gas X and a cup of tea, pt. Mother asking for otc recommendations, advised she can try gaviscon otc for reflux pain, if no improvement or worsening symptoms she will call back tomorrow, pt. Is going to a horse competition on Saturday and can be causing some added stress, please advise     Reason for Disposition   The patient has epigastric pain for <3 months    Answer Questions - Initial Assessment - Gerd New  When did your symptoms start: today     How often is this occurring: all day     What medication are you currently taking: nothing     Have you tried any OTC medications: Tums    What was the outcome of taking the medication for this symptom: stayed the same    Any recent changes in your bowel habits: denies     Any new life stressors or diet changes: yes, going to a horse competition this weekend     Anything that makes this better: nothing     Have you had any recent blood work, imaging, or procedures: none    Protocols used: GERD

## 2024-08-13 NOTE — TELEPHONE ENCOUNTER
I spoke with patient's mother and reviewed Dr. Lan's reply Agree with gaviscon, gas-abraham and can also add OTC pepcid 20mg. I advised patient can use the famotidine up to two times a day if needed for relief.

## 2024-08-23 DIAGNOSIS — K50.012 CROHN'S DISEASE OF SMALL INTESTINE WITH INTESTINAL OBSTRUCTION (HCC): ICD-10-CM

## 2024-08-23 RX ORDER — RISANKIZUMAB-RZAA 360 MG/2.4
WEARABLE INJECTOR SUBCUTANEOUS
Qty: 2.4 ML | Refills: 5 | Status: SHIPPED | OUTPATIENT
Start: 2024-08-23

## 2024-08-26 ENCOUNTER — EVALUATION (OUTPATIENT)
Dept: PHYSICAL THERAPY | Facility: REHABILITATION | Age: 19
End: 2024-08-26
Payer: COMMERCIAL

## 2024-08-26 DIAGNOSIS — M62.89 PELVIC FLOOR TENSION: ICD-10-CM

## 2024-08-26 DIAGNOSIS — N94.2 VAGINISMUS: Primary | ICD-10-CM

## 2024-08-26 PROCEDURE — 97530 THERAPEUTIC ACTIVITIES: CPT | Performed by: PHYSICAL THERAPIST

## 2024-08-26 PROCEDURE — 97140 MANUAL THERAPY 1/> REGIONS: CPT | Performed by: PHYSICAL THERAPIST

## 2024-08-26 NOTE — PROGRESS NOTES
PT Discharge    Today's date: 2024  Patient name: Harry Corley  : 2005  MRN: [id number]  Referring provider: Jes Yao P*  Dx:   Encounter Diagnosis     ICD-10-CM    1. Vaginismus  N94.2       2. Pelvic floor tension  M62.89           Start Time: 1505          Assessment    Assessment details: Harry did well with her physical therapy and her outcome measure scores have improved by 50% to indicate decreased pelvic pain. She was compliant with self stretching and relaxation recommendations and is appropriate for discharge to self management. I have issued her ordering information for a vaginal dilator set to continue with stretching on her own and also a book recommendation.  Understanding of Dx/Px/POC: good     Prognosis: good    Goals  STGs to be met in 6 weeks:  * Patient will be compliant with introductory HEP as prescribed. MET  * Patient will report 50% less pain with palpation or use of tampon. MET    LTGs to be met by discharge:  * Patient will present with reduced VPQ score of 50% to indicate improved tolerance to palpation and examination. MET  * Normalize findings on sEMG to indicate PFM strength average of at least 12uV and resting average < 2.5uV. MET  * Implements relaxation strategies on a daily basis. MET  * Patient will report 80% reduction in discomfort with either palpation or intimacy. NOT MET, THIS CAN VARY  * Patient will be proficient and compliant with use of vaginal dilator (s) for progression of self PFM stretching in 3-4 visits if indicated during treatment.  ONGOING  * Patient will be compliant with comprehensive home exercise program for self management of condition. ONGOING      Plan  Patient would benefit from: skilled physical therapy    Planned therapy interventions: stretching, therapeutic activities, therapeutic exercise, therapeutic training and home exercise program    Frequency: 1 VISIT.  Plan of Care beginning date: 2024  Plan of Care expiration  "date: 8/26/2024  Treatment plan discussed with: patient    PT Pelvic Floor Subjective:   History of Present Illness:   Patient overall pleased. Able to wear tampons, perform self stretching and has been able to have intercourse with her partner. Still with entrance discomfort and spasms at times with deeper insertion. Will be leaving for [place] in November.         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  Diet and Exercise:      [place] 3 and Pilates in addition to riding   OB/ gyn History    Gestational History:     Prior Pregnancy: No      Menstrual History:    Date of last menstrual period: 5/13/2024    Menstrual irregularities regular menses    Painful periods:  Difficulty managing menstrual pain (abdoinal and lower back pain first 2 days which is managed with Motrin)    Tolerates tampons: yes (\"on and off\" she uses them)    Menopausal: no menopause    Birth control: no contraception  no hormone replacement therapy  Bladder Function:      Voiding Difficulties comments:     Voiding frequency: every 3-4 hours    Urinary leakage: no urine leakage    Nocturia (episodes per night): 1 and 0    Painful urination: No      Intake (ounces): Water intake (oz): 50-60 ounces. Coffee intake (oz): occasional. Tea intake (oz): occasional.     Intake (ounces) comment: Aloe water - occasionally   Bowel Function:     Voiding DIfficulties: constipation      Bowel Function comments:  Occasionally takes a laxative   Takes supplement called Beatris to help with regularity   Also takes Clearstem to help with hormonal acne    [place] Stool Scale: type 3 and type 2    Stool softener use: no stool softeners    Uses \"squatty potty\": no Squatty Potty  Sexual Function:     Sexually Active:  Sexually active    Pain during intercourse: Yes      Lubrication use during intercourse: sometimes.    pain causes " abstinence    Patient wishes to return to having intercourse: currently unable to have intercourse but wants to  Diagnostic Tests:     None    Treatments:     None    Patient Goals:     Patient goals for therapy:  Improved pain management, improved quality of life, relaxation, improved comfort, improved bladder or bowel function, fully empty bladder or bowels and decreased pain      Objective       Abdominal Assessment:      Abdominal Assessment: na      General Perineum Exam:   perineum intact.     General perineum exam comments: No discharge, irritation, organ prolapse or skin breakdown evident    Layer 1: no pain or tenderness, tone wnl  Layer 2: no pain, mildly increased tone along R  Layer 3: mild pain along periurethral tissue on R (1/10) and L (1-2/10) and L lateral IC and OI (2/10)    Patient demonstrates weak concentric lift but poor relaxation initially but with both verbal and manual cuing, she was able to volitionally relax about 50% with focus    Visual Inspection of Perineum:   Excursion of perineal body in cephalad direction with contraction of pelvic floor muscles (PFM): delayed  and weak  Excursion of perineal body in caudal direction with relaxation of pelvic floor muscles (PFM): delayed , unable and weak  Involuntary relaxation with bearing down: no  Cotton swab test: non-tender  Sphincter Tone Resting: increased  Sphincter Tone Squeeze: weak  Sensation: intact    Pelvic Organ Prolapse   no pelvic organ prolapse  Perineal body inspection: elevated        Pelvic Floor Muscle Exam:       Pelvic floor muscle relaxation is delayed and incomplete.   50% pelvic floor relaxation        PERFECT Score   Power right: 1+/5   Power left: 1+/5   Endurance (seconds to max): 5   Repetitions (before fatigue): 3        pelvic floor exam consent given by patient    Pelvic exam completed: vaginally     SMEG Biofeedback   to be assessed next treatment           Precautions:   Patient Active Problem List   Diagnosis     Crohn's disease (HCC)    Vitamin D deficiency    Port wine stain    Painful and cold lower extremity    Anemia       PRO EVAL RE-EVAL DISCHARGE   PFDI      JULIO-18      VPQ 30.3  15.15   CPSI-NIH      PGQ            Date of Service 5/20 5/28 6/11 6/27 7/1 7/10 7/15 8/07 8/26   Visits Used            Visits Remaining                        Manuals            C/r & bear down    10 5' 10'  20' 10'   PFM stretching   20' 20' 20' 20' w/ 1& 2 stacked fingers 15' 25' including 1& 2 fingers 30'   Adductor release (cupping)   15' 10' nv 15' 15'     VM     As charted x30' Bowel massage & lower abdominal w/ cupping 10' 15'                 Neuro Re-Ed            BF  55'          C/R/ bear down   3'         Pelvic elevators   10x         DB   5'                                             Ther Ex                                                                                    Ther Activity            Education Anatomy and POC       Bowel recommendations   Hormone discussion, book recommendations, vibrating dilator recommendations                                                                Modalities

## 2024-10-03 ENCOUNTER — OFFICE VISIT (OUTPATIENT)
Dept: FAMILY MEDICINE CLINIC | Facility: MEDICAL CENTER | Age: 19
End: 2024-10-03
Payer: COMMERCIAL

## 2024-10-03 VITALS
OXYGEN SATURATION: 98 % | HEIGHT: 67 IN | HEART RATE: 61 BPM | TEMPERATURE: 98.2 F | DIASTOLIC BLOOD PRESSURE: 70 MMHG | BODY MASS INDEX: 17.7 KG/M2 | SYSTOLIC BLOOD PRESSURE: 100 MMHG

## 2024-10-03 DIAGNOSIS — Z00.00 ANNUAL PHYSICAL EXAM: Primary | ICD-10-CM

## 2024-10-03 DIAGNOSIS — Z13.0 SCREENING, DEFICIENCY ANEMIA, IRON: ICD-10-CM

## 2024-10-03 DIAGNOSIS — R22.9 SUBCUTANEOUS NODULE: ICD-10-CM

## 2024-10-03 DIAGNOSIS — Z13.29 SCREENING FOR THYROID DISORDER: ICD-10-CM

## 2024-10-03 DIAGNOSIS — E55.9 VITAMIN D DEFICIENCY: ICD-10-CM

## 2024-10-03 PROCEDURE — 99213 OFFICE O/P EST LOW 20 MIN: CPT | Performed by: STUDENT IN AN ORGANIZED HEALTH CARE EDUCATION/TRAINING PROGRAM

## 2024-10-03 PROCEDURE — 99395 PREV VISIT EST AGE 18-39: CPT | Performed by: STUDENT IN AN ORGANIZED HEALTH CARE EDUCATION/TRAINING PROGRAM

## 2024-10-03 NOTE — PATIENT INSTRUCTIONS
"Patient Education     Good food sources of iron   The Basics   Written by the doctors and editors at [facility]   What is iron? -- Iron is a mineral that your body needs to make \"hemoglobin.\" Hemoglobin is a protein in the blood. It helps red blood cells carry oxygen to all parts of the body.  The amount of iron that you need in your diet depends on your age and sex. Your overall health also plays a role in how much iron you need each day.  Some people do not have enough iron. This is called \"iron deficiency.\" Getting plenty of iron through your diet can help prevent iron deficiency. But if you already have too little iron, eating foods with iron will not be enough to treat it. In this case, your doctor will prescribe extra iron to correct your levels.  What foods are good sources of iron? -- It depends on age:   Babies - It's important to make sure that babies get enough iron, especially if they drink breast milk.   Babies who drink breast milk need extra iron by the time they are 4 months old. This could come from an iron supplement, or solid foods that are high in iron (such as meats or iron-fortified baby cereal).   Babies who do not drink breast milk should drink an \"iron-fortified\" formula. (Formulas labeled \"low-iron\" will not provide enough iron.) Cow's milk and other types of milk do not have the right amount of iron or other nutrients for babies younger than 1 year old.   When a baby starts eating solid foods, include good sources of iron. Examples include meats or iron-fortified baby cereal.   Wait until your baby is at least 1 year old before switching from breast milk or formula to cow's milk or another type of milk.   Children and adults - Eating a healthy, balanced diet will give most people enough iron. Meat is a common source of iron. But if you don't eat meat, you should eat plenty of other foods that are rich in iron. Below are some examples of foods that are considered \"high\" or \"moderate\" in " iron.   High-iron grains - Whole-wheat breads, cereals, and bagels. Flour tortillas, biscuits, English or bran muffins, iron-fortified bran, pretzels, frozen waffles. Hot cereals like oatmeal, cream of wheat, or grits.   Moderate-iron grains - Kristy bread, egg noodles, whole-wheat pasta, hamburger and hot dog buns.   Moderate-iron fruits - Dried apricots, dried figs, prune juice.   High-iron vegetables - Spinach, soybeans, canned pumpkin.   Moderate-iron vegetables - Asparagus, [place] sprouts, mushrooms, green peas, white or sweet potato with the skin, tomato sauce, beets, beans such as garbanzo or lima. Greens such as graham, turnip, kale, beet, or Swiss chard.   High-iron meats and other proteins - Beef, veal, lamb, pork, beef or chicken liver, clams, sardines, oysters, shrimp, tofu, baked beans with pork, lentils, tahini, tempeh. Beans such as kidney, lima, navy, or white.   Moderate-iron meats and other proteins - Chicken breast, turkey, eggs, fresh or canned tuna or mackerel.   Other high-iron foods - Pumpkin seeds.   Other moderate-iron foods - Molasses, soy milk, seeds such as sesame or sunflower. Nuts such as almonds, pistachios, cashews, and walnuts.  What else should I know? -- Some medicines and foods can change how much iron you absorb from your food. Talk to your doctor, nurse, or dietitian if you have questions.  All topics are updated as new evidence becomes available and our peer review process is complete.  This topic retrieved from [employer] on: Mar 20, 2024.  Topic 370197 Version 2.0  Release: 32.2.4 - C32.78  © 2024 UpToDate, Inc. and/or its affiliates. All rights reserved.  Consumer Information Use and Disclaimer   Disclaimer: This generalized information is a limited summary of diagnosis, treatment, and/or medication information. It is not meant to be comprehensive and should be used as a tool to help the user understand and/or assess potential diagnostic and treatment options. It does NOT  "include all information about conditions, treatments, medications, side effects, or risks that may apply to a specific patient. It is not intended to be medical advice or a substitute for the medical advice, diagnosis, or treatment of a health care provider based on the health care provider's examination and assessment of a patient's specific and unique circumstances. Patients must speak with a health care provider for complete information about their health, medical questions, and treatment options, including any risks or benefits regarding use of medications. This information does not endorse any treatments or medications as safe, effective, or approved for treating a specific patient. UpToDate, Inc. and its affiliates disclaim any warranty or liability relating to this information or the use thereof.The use of this information is governed by the Terms of Use, available at https://www.Novitaz.Crazidea/en/know/clinical-effectiveness-terms. 2024© UpToDate, Inc. and its affiliates and/or licensors. All rights reserved.  Copyright   © 2024 UpToDate, Inc. and/or its affiliates. All rights reserved.    Patient Education     Routine physical for adults   The Basics   Written by the doctors and editors at [employer]   What is a physical? -- A physical is a routine visit, or \"check-up,\" with your doctor. You might also hear it called a \"wellness visit\" or \"preventive visit.\"  During each visit, the doctor will:   Ask about your physical and mental health   Ask about your habits, behaviors, and lifestyle   Do an exam   Give you vaccines if needed   Talk to you about any medicines you take   Give advice about your health   Answer your questions  Getting regular check-ups is an important part of taking care of your health. It can help your doctor find and treat any problems you have. But it's also important for preventing health problems.  A routine physical is different from a \"sick visit.\" A sick visit is when you see a " doctor because of a health concern or problem. Since physicals are scheduled ahead of time, you can think about what you want to ask the doctor.  How often should I get a physical? -- It depends on your age and health. In general, for people age 21 years and older:   If you are younger than 50 years, you might be able to get a physical every 3 years.   If you are 50 years or older, your doctor might recommend a physical every year.  If you have an ongoing health condition, like diabetes or high blood pressure, your doctor will probably want to see you more often.  What happens during a physical? -- In general, each visit will include:   Physical exam - The doctor or nurse will check your height, weight, heart rate, and blood pressure. They will also look at your eyes and ears. They will ask about how you are feeling and whether you have any symptoms that bother you.   Medicines - It's a good idea to bring a list of all the medicines you take to each doctor visit. Your doctor will talk to you about your medicines and answer any questions. Tell them if you are having any side effects that bother you. You should also tell them if you are having trouble paying for any of your medicines.   Habits and behaviors - This includes:   Your diet   Your exercise habits   Whether you smoke, drink alcohol, or use drugs   Whether you are sexually active   Whether you feel safe at home  Your doctor will talk to you about things you can do to improve your health and lower your risk of health problems. They will also offer help and support. For example, if you want to quit smoking, they can give you advice and might prescribe medicines. If you want to improve your diet or get more physical activity, they can help you with this, too.   Lab tests, if needed - The tests you get will depend on your age and situation. For example, your doctor might want to check your:   Cholesterol   Blood sugar   Iron level   Vaccines - The recommended  "vaccines will depend on your age, health, and what vaccines you already had. Vaccines are very important because they can prevent certain serious or deadly infections.   Discussion of screening - \"Screening\" means checking for diseases or other health problems before they cause symptoms. Your doctor can recommend screening based on your age, risk, and preferences. This might include tests to check for:   Cancer, such as breast, prostate, cervical, ovarian, colorectal, prostate, lung, or skin cancer   Sexually transmitted infections, such as chlamydia and gonorrhea   Mental health conditions like depression and anxiety  Your doctor will talk to you about the different types of screening tests. They can help you decide which screenings to have. They can also explain what the results might mean.   Answering questions - The physical is a good time to ask the doctor or nurse questions about your health. If needed, they can refer you to other doctors or specialists, too.  Adults older than 65 years often need other care, too. As you get older, your doctor will talk to you about:   How to prevent falling at home   Hearing or vision tests   Memory testing   How to take your medicines safely   Making sure that you have the help and support you need at home  All topics are updated as new evidence becomes available and our peer review process is complete.  This topic retrieved from [employer] on: May 02, 2024.  Topic 470603 Version 1.0  Release: 32.4.3 - C32.122  © 2024 UpToDate, Inc. and/or its affiliates. All rights reserved.  Consumer Information Use and Disclaimer   Disclaimer: This generalized information is a limited summary of diagnosis, treatment, and/or medication information. It is not meant to be comprehensive and should be used as a tool to help the user understand and/or assess potential diagnostic and treatment options. It does NOT include all information about conditions, treatments, medications, side effects, or " risks that may apply to a specific patient. It is not intended to be medical advice or a substitute for the medical advice, diagnosis, or treatment of a health care provider based on the health care provider's examination and assessment of a patient's specific and unique circumstances. Patients must speak with a health care provider for complete information about their health, medical questions, and treatment options, including any risks or benefits regarding use of medications. This information does not endorse any treatments or medications as safe, effective, or approved for treating a specific patient. UpToDate, Inc. and its affiliates disclaim any warranty or liability relating to this information or the use thereof.The use of this information is governed by the Terms of Use, available at https://www.woltersRoomle GmbHuwer.com/en/know/clinical-effectiveness-terms. 2024© UpToDate, Inc. and its affiliates and/or licensors. All rights reserved.  Copyright   © 2024 UpToDate, Inc. and/or its affiliates. All rights reserved.

## 2024-10-03 NOTE — PROGRESS NOTES
[facility] HEALTH MAINTENANCE OFFICE VISIT  [facility] - [facility] [place]    NAME: Harry Corley  AGE: 18 y.o. SEX: female  : 2005     DATE: 10/3/2024    Assessment and Plan     1. Annual physical exam  2. Body mass index (BMI) less than 19 in adult       - Nutritional counseling provided, advised could also reconnect with nutrition services  3. Screening for thyroid disorder  -     TSH, 3rd generation with Free T4 reflex  4. Vitamin D deficiency  -     Vitamin D 25 hydroxy  5. Screening, deficiency anemia, iron  -     Iron Panel (Includes Ferritin, Iron Sat%, Iron, and TIBC); Future  6. Subcutaneous nodule  -     US head neck soft tissue; Future; Expected date: 10/03/2024  -     CBC (Includes Diff/Plt) (Refl); Future; Expected date: 10/03/2024      Patient Counseling:   Nutrition: Stress importance of a well balanced diet, moderation of sodium/saturated fat, caloric balance and sufficient intake of fiber  Exercise: Stress the importance of regular exercise with a goal of 150 minutes per week  Dental Health: Discussed daily flossing and brushing and regular dental visits   Immunizations reviewed:   Patient does not usually receive influenza vaccine  Informed about update with COVID-19 vaccine  Tetanus booster will be due 2025  Informed about PCV 20 vaccine  Discussed benefits of:    Patient established with gynecology for well woman care, informed Pap smear will be due at 19 years old  Next colonoscopy scheduled 2024 in [facility]  Declines preventative screenings for hepatitis C and HIV at this time  BMI Counseling: Body mass index is 17.7 kg/m². Discussed with patient's BMI with her.   No follow-ups on file.      Depression Screening and Follow-up Plan: Patient was screened for depression during today's encounter. They screened negative with a PHQ-2 score of 0.        Return in 1 year for annual physical and sooner as needed.  Chief Complaint  "    Chief Complaint   Patient presents with    Annual Exam     Physical; Swollen lymph node. Pt is requesting THS, T3, T4, Vitamin D and iron blood panels.       History of Present Illness     HPI    Well Adult Physical   Patient here for a comprehensive physical exam.  Patient requests iron panel and thyroid function testing.  Patient mention she is trying to improve her nutrition, eating more protein such as chicken, turkey, seafood.  Patient also mentions nodule left aspect of neck that both massage therapist and chiropractor have noted.  Patient mentions it has decreased in size some.  Nontender.      Diet and Physical Activity  Diet: avoids gluten, dairy and red meat, limits soy \"try to keep it balanced chicken, turkey, seafood\"   Exercise: several times per week      Depression Screen  PHQ-2/9 Depression Screening    Little interest or pleasure in doing things: 0 - not at all  Feeling down, depressed, or hopeless: 0 - not at all  PHQ-2 Score: 0  PHQ-2 Interpretation: Negative depression screen         Reproductive Health  Follows with gynecologist      The following portions of the patient's history were reviewed and updated as appropriate: allergies, current medications, past family history, past medical history, past social history, past surgical history and problem list.    Review of Systems     Review of Systems    As noted in HPI     Past Medical History     Past Medical History:   Diagnosis Date    Abnormal weight loss     last assessed: 1/15/15    Anemia     resolved: 12/10/15    Asthma     Calcaneal apophysitis     left; lasts assessed: 2/17/14    Crohn's disease (HCC)     Mass of breast, left     last assessed: 6/9/15    Unspecified subluxation of left patella, initial encounter     last assessed: 2/2/16    Vitamin D deficiency        Past Surgical History     Past Surgical History:   Procedure Laterality Date    COLONOSCOPY      ESOPHAGOGASTRODUODENOSCOPY      PORT WINE STAIN REMOVAL W/ LASER   "       Social History     Social History     Socioeconomic History    Marital status: Single     Spouse name: None    Number of children: None    Years of education: None    Highest education level: None   Occupational History    None   Tobacco Use    Smoking status: Never    Smokeless tobacco: Never   Vaping Use    Vaping status: Never Used   Substance and Sexual Activity    Alcohol use: No    Drug use: No    Sexual activity: Never   Other Topics Concern    None   Social History Narrative    Lives with parents     Social Determinants of Health     Financial Resource Strain: Low Risk  (12/20/2023)    Received from [employer] Social Needs Screening - Medical Financial Resource Strain     Would you like help with any of the following needs: food, medicine/medical supplies, transportation, loneliness, housing or utilities?: See other domains   Food Insecurity: No Food Insecurity (12/20/2023)    Received from [employer] Social Needs Screening - Food Insecurity     Would you like help with any of the following needs: food, medicine/medical supplies, transportation, loneliness, housing or utilities?: See other domains     Would you like help with any of the following needs: food, medicine/medical supplies, transportation, loneliness, or housing/utilities?: Not on file   Transportation Needs: No Transportation Needs (12/20/2023)    Received from [employer] Social Needs Screening - Transportation     Would you like help with any of the following needs: food, medicine/medical supplies, transportation, loneliness, housing or utilities?: See other domains   Physical Activity: Not on file   Stress: Not on file   Social Connections: Unknown (5/8/2024)    Received from [employer] Social Needs Screening - Social Connection     Would you like help with any of the following needs: food, medicine/medical supplies,  "transportation, loneliness, housing or utilities?: Not on file   Intimate Partner Violence: Not on file   Housing Stability: Low Risk  (12/20/2023)    Received from [facility] Short Social Needs Screening - Housing     Would you like help with any of the following needs: food, medicine/medical supplies, transportation, loneliness, housing or utilities?: See other domains       Family History     Family History   Problem Relation Age of Onset    Hypothyroidism Mother     No Known Problems Father     Hypothyroidism Maternal Grandmother     Diabetes Family     Cancer Family     ROJELIO disease Family     Allergies Family     Colon cancer Other        Current Medications       Current Outpatient Medications:     Cholecalciferol (VITAMIN D3) 2000 units capsule, Take 1 capsule by mouth daily Take 3000 iu daily , Disp: , Rfl:     diphenhydramine, lidocaine, Al/Mg hydroxide, simethicone (Magic Mouthwash) SUSP, Swish and swallow 10 mL every 4 (four) hours as needed for mouth pain or discomfort, Disp: 119 mL, Rfl: 0    ondansetron (ZOFRAN-ODT) 4 mg disintegrating tablet, Take 1 tablet (4 mg total) by mouth every 6 (six) hours as needed for nausea or vomiting, Disp: 20 tablet, Rfl: 0    risankizumab-rzaa (Skyrizi) 360 MG/2.4ML SOCT, INJECT THE CONTENTS OF 1  CARTRIDGE SUBCUTANEOUSLY EVERY 8 WEEKS, Disp: 2.4 mL, Rfl: 5    miconazole (MONISTAT-7) 2 % vaginal cream, Insert 1 applicator into the vagina daily at bedtime (Patient not taking: Reported on 10/3/2024), Disp: 45 g, Rfl: 0     Allergies     Allergies   Allergen Reactions    Gluten Meal - Food Allergy     Seasonal Ic [Cholestatin]     Tilactase        Objective     /70 (BP Location: Left arm, Patient Position: Sitting, Cuff Size: Standard)   Pulse 61   Temp 98.2 °F (36.8 °C) (Temporal)   Ht 5' 7\" (1.702 m)   SpO2 98%   BMI 17.70 kg/m²      Physical Exam  Vitals reviewed.   Constitutional:       General: She is not in acute distress.     " Appearance: Normal appearance.   HENT:      Head: Normocephalic and atraumatic.      Right Ear: Tympanic membrane, ear canal and external ear normal.      Left Ear: Tympanic membrane, ear canal and external ear normal.      Nose: Nose normal.      Mouth/Throat:      Mouth: Mucous membranes are moist.      Pharynx: Oropharynx is clear.   Eyes:      General:         Right eye: No discharge.         Left eye: No discharge.      Extraocular Movements: Extraocular movements intact.      Conjunctiva/sclera: Conjunctivae normal.      Pupils: Pupils are equal, round, and reactive to light.   Neck:        Comments: Subcu nodularity, nontender  Cardiovascular:      Rate and Rhythm: Normal rate and regular rhythm.      Pulses: Normal pulses.      Heart sounds: Normal heart sounds.   Pulmonary:      Effort: Pulmonary effort is normal.      Breath sounds: Normal breath sounds.   Abdominal:      General: Abdomen is flat. Bowel sounds are normal.      Palpations: Abdomen is soft.      Tenderness: There is no abdominal tenderness.   Musculoskeletal:      Cervical back: Neck supple. No rigidity.      Right lower leg: No edema.      Left lower leg: No edema.   Skin:     General: Skin is warm and dry.      Capillary Refill: Capillary refill takes less than 2 seconds.   Neurological:      Mental Status: She is alert and oriented to person, place, and time.   Psychiatric:         Mood and Affect: Mood normal.         Behavior: Behavior normal.         Thought Content: Thought content normal.         Judgment: Judgment normal.           No results found.        Ailyn Chang DO  [facility]

## 2024-10-10 ENCOUNTER — HOSPITAL ENCOUNTER (OUTPATIENT)
Dept: RADIOLOGY | Age: 19
Discharge: HOME | End: 2024-10-10
Payer: COMMERCIAL

## 2024-10-10 DIAGNOSIS — R22.9 SUBCUTANEOUS NODULE: ICD-10-CM

## 2024-10-10 PROCEDURE — 76536 US EXAM OF HEAD AND NECK: CPT

## 2024-11-06 ENCOUNTER — TELEPHONE (OUTPATIENT)
Age: 19
End: 2024-11-06

## 2024-11-06 NOTE — TELEPHONE ENCOUNTER
Pt's mother called in stating the pt has congestion, a cough, and a sore throat and would like to come in on 11/7 or 11/8 to be evaluated. She's requesting a call back.

## 2024-11-08 ENCOUNTER — OFFICE VISIT (OUTPATIENT)
Dept: FAMILY MEDICINE CLINIC | Facility: MEDICAL CENTER | Age: 19
End: 2024-11-08
Payer: COMMERCIAL

## 2024-11-08 VITALS
DIASTOLIC BLOOD PRESSURE: 54 MMHG | SYSTOLIC BLOOD PRESSURE: 100 MMHG | HEART RATE: 87 BPM | WEIGHT: 106.4 LBS | OXYGEN SATURATION: 99 % | BODY MASS INDEX: 16.7 KG/M2 | HEIGHT: 67 IN | TEMPERATURE: 98.1 F | RESPIRATION RATE: 18 BRPM

## 2024-11-08 DIAGNOSIS — J06.9 UPPER RESPIRATORY TRACT INFECTION, UNSPECIFIED TYPE: Primary | ICD-10-CM

## 2024-11-08 LAB
S PYO AG THROAT QL: NEGATIVE
SARS-COV-2 AG UPPER RESP QL IA: NEGATIVE
SL AMB POCT RAPID FLU A: NEGATIVE
SL AMB POCT RAPID FLU B: NEGATIVE
VALID CONTROL: NORMAL

## 2024-11-08 PROCEDURE — 87880 STREP A ASSAY W/OPTIC: CPT | Performed by: STUDENT IN AN ORGANIZED HEALTH CARE EDUCATION/TRAINING PROGRAM

## 2024-11-08 PROCEDURE — 99213 OFFICE O/P EST LOW 20 MIN: CPT | Performed by: STUDENT IN AN ORGANIZED HEALTH CARE EDUCATION/TRAINING PROGRAM

## 2024-11-08 PROCEDURE — 87811 SARS-COV-2 COVID19 W/OPTIC: CPT | Performed by: STUDENT IN AN ORGANIZED HEALTH CARE EDUCATION/TRAINING PROGRAM

## 2024-11-08 PROCEDURE — 87804 INFLUENZA ASSAY W/OPTIC: CPT | Performed by: STUDENT IN AN ORGANIZED HEALTH CARE EDUCATION/TRAINING PROGRAM

## 2024-11-08 NOTE — PROGRESS NOTES
[facility] - Clinic Note  Ailyn Chang DO, 24     Harry Corley MRN: [id number] : 2005 Age: 19 y.o.     Assessment/Plan     1. Upper respiratory tract infection, unspecified type    - POCT Rapid Covid Ag negative  - POCT rapid flu A and B negative  - POCT rapid ANTIGEN strepA negative  -Fluids, rest, supportive care measures, Vicks vapor rub, saline nasal spray, lozenges, DayQuil/NyQuil  - amoxicillin-clavulanate (AUGMENTIN) 875-125 mg per tablet; Take 1 tablet by mouth every 12 (twelve) hours for 5 days  Dispense: 10 tablet; Refill: 0 (delayed antibiotic strategy, take if symptoms not improving by day 10, unilateral yellow nasal discharge, sinus tenderness)      Harry Corley acknowledged understanding of treatment plan, all questions answered.    Subjective      Harry Corley is a 19 y.o. female who presents for acute visit.  She presents today with her boyfriend.  Patient complains of symptoms of a URI. Symptoms include nasal congestion, nasal discharge yellow, no  fever, post nasal drip, and sore throat. Onset of symptoms was 3 days ago, and has been gradually worsening since that time. Treatment to date: tea, cough lozenges.  She has upcoming travel to [place].    The following portions of the patient's history were reviewed and updated as appropriate: allergies, current medications, past family history, past medical history, past social history, past surgical history and problem list.     Past Medical History:   Diagnosis Date    Abnormal weight loss     last assessed: 1/15/15    Anemia     resolved: 12/10/15    Asthma     Calcaneal apophysitis     left; lasts assessed: 14    Crohn's disease (HCC)     Mass of breast, left     last assessed: 6/9/15    Unspecified subluxation of left patella, initial encounter     last assessed: 16    Vitamin D deficiency        Allergies   Allergen Reactions    Gluten Meal - Food Allergy     Seasonal Ic [Cholestatin]     Tilactase         Past Surgical History:   Procedure Laterality Date    COLONOSCOPY      ESOPHAGOGASTRODUODENOSCOPY      PORT WINE STAIN REMOVAL W/ LASER         Family History   Problem Relation Age of Onset    Hypothyroidism Mother     No Known Problems Father     Hypothyroidism Maternal Grandmother     Diabetes Family     Cancer Family     ROJELIO disease Family     Allergies Family     Colon cancer Other        Social History     Socioeconomic History    Marital status: Single     Spouse name: None    Number of children: None    Years of education: None    Highest education level: None   Occupational History    None   Tobacco Use    Smoking status: Never    Smokeless tobacco: Never   Vaping Use    Vaping status: Never Used   Substance and Sexual Activity    Alcohol use: No    Drug use: No    Sexual activity: Never   Other Topics Concern    None   Social History Narrative    Lives with parents     Social Determinants of Health     Financial Resource Strain: Low Risk  (12/20/2023)    Received from [email or web address] [facility] [employer] Social Needs Screening - Medical Financial Resource Strain     Would you like help with any of the following needs: food, medicine/medical supplies, transportation, loneliness, housing or utilities?: See other domains   Food Insecurity: No Food Insecurity (12/20/2023)    Received from [email or web address] [facility] Short Social Needs Screening - Food Insecurity     Would you like help with any of the following needs: food, medicine/medical supplies, transportation, loneliness, housing or utilities?: See other domains     Would you like help with any of the following needs: food, medicine/medical supplies, transportation, loneliness, or housing/utilities?: Not on file   Transportation Needs: No Transportation Needs (12/20/2023)    Received from [email or web address] [facility] [employer] Social Needs Screening - Transportation     Would you like help with any of the following needs: food,  "medicine/medical supplies, transportation, loneliness, housing or utilities?: See other domains   Physical Activity: Not on file   Stress: Not on file   Social Connections: Unknown (5/8/2024)    Received from [facility] Short Social Needs Screening - Social Connection     Would you like help with any of the following needs: food, medicine/medical supplies, transportation, loneliness, housing or utilities?: Not on file   Intimate Partner Violence: Not on file   Housing Stability: Low Risk  (12/20/2023)    [facility] Short Social Needs Screening - Housing     Would you like help with any of the following needs: food, medicine/medical supplies, transportation, loneliness, housing or utilities?: See other domains       Current Outpatient Medications   Medication Sig Dispense Refill    Cholecalciferol (VITAMIN D3) 2000 units capsule Take 1 capsule by mouth daily Take 3000 iu daily       diphenhydramine, lidocaine, Al/Mg hydroxide, simethicone (Magic Mouthwash) SUSP Swish and swallow 10 mL every 4 (four) hours as needed for mouth pain or discomfort 119 mL 0    ondansetron (ZOFRAN-ODT) 4 mg disintegrating tablet Take 1 tablet (4 mg total) by mouth every 6 (six) hours as needed for nausea or vomiting 20 tablet 0    risankizumab-rzaa (Skyrizi) 360 MG/2.4ML SOCT INJECT THE CONTENTS OF 1  CARTRIDGE SUBCUTANEOUSLY EVERY 8 WEEKS 2.4 mL 5    miconazole (MONISTAT-7) 2 % vaginal cream Insert 1 applicator into the vagina daily at bedtime (Patient not taking: Reported on 10/3/2024) 45 g 0     No current facility-administered medications for this visit.       Review of Systems     As noted in HPI    Objective      /54 (BP Location: Left arm, Patient Position: Sitting, Cuff Size: Standard)   Pulse 87   Temp 98.1 °F (36.7 °C) (Temporal)   Resp 18   Ht 5' 7\" (1.702 m)   Wt 48.3 kg (106 lb 6.4 oz)   SpO2 99%   BMI 16.66 kg/m²     Physical Exam  Vitals reviewed. " "  Constitutional:       General: She is not in acute distress.     Appearance: She is not toxic-appearing.   HENT:      Head: Normocephalic and atraumatic.      Right Ear: Tympanic membrane, ear canal and external ear normal.      Left Ear: Tympanic membrane, ear canal and external ear normal.      Nose: Congestion and rhinorrhea present.      Right Sinus: No maxillary sinus tenderness or frontal sinus tenderness.      Left Sinus: No maxillary sinus tenderness or frontal sinus tenderness.      Mouth/Throat:      Mouth: Mucous membranes are moist.      Pharynx: Oropharynx is clear. Posterior oropharyngeal erythema present. No oropharyngeal exudate.   Eyes:      General:         Right eye: No discharge.         Left eye: No discharge.      Extraocular Movements: Extraocular movements intact.      Conjunctiva/sclera: Conjunctivae normal.      Pupils: Pupils are equal, round, and reactive to light.   Cardiovascular:      Rate and Rhythm: Normal rate and regular rhythm.      Pulses: Normal pulses.      Heart sounds: Normal heart sounds.   Pulmonary:      Effort: Pulmonary effort is normal.      Breath sounds: Normal breath sounds.   Musculoskeletal:      Cervical back: Neck supple. No rigidity.   Lymphadenopathy:      Cervical: No cervical adenopathy.   Skin:     General: Skin is warm and dry.   Neurological:      Mental Status: She is alert and oriented to person, place, and time.   Psychiatric:         Mood and Affect: Mood normal.         Behavior: Behavior normal.         Thought Content: Thought content normal.             Some portions of this record may have been generated with voice recognition software. There may be translation, syntax, or grammatical errors. Occasional wrong word or \"sound-a-like\" substitutions may have occurred due to the inherent limitations of the voice recognition software. Read the chart carefully and recognize, using context, where substations may have occurred. If you have any questions, " please contact the dictating provider for clarification or correction, as needed.

## 2024-12-04 ENCOUNTER — RESULTS FOLLOW-UP (OUTPATIENT)
Dept: FAMILY MEDICINE CLINIC | Facility: MEDICAL CENTER | Age: 19
End: 2024-12-04

## 2024-12-06 ENCOUNTER — TELEPHONE (OUTPATIENT)
Age: 19
End: 2024-12-06

## 2024-12-06 NOTE — TELEPHONE ENCOUNTER
Mother calling regarding letter stating that patient has Crohn's.  Doctor did one last year (is under letter tab dated 12/7/23).  Needs another letter the same as this sent to [employer] so can apply for secondary insurance.

## 2024-12-06 NOTE — LETTER
December 6, 2024    Regarding:  Harry Corley  [address]  [facility] [address]    To whom it may concern:    Harry Corley is a 19 y.o. female with Crohn's disease (onset 2013 with pain and CT with TI involvement) with multiple episodes of small bowel obstructions including July and November 2022. Previously maintained on oral Pentasa for several years, subsequently with weekly methotrexate for the majority of 2020, then Entocort. Currently receiving Skyrizi injections every 8 weeks. Skyrizi is a biologic that works by blocking a protein called IL-23, which is involved in inflammation and immune response. Skryizi can be used in Crohn's disease to induce and maintain remission. Harry has been well controlled on Skyrizi with her Crohn's Disease.     If you have any questions or concerns, please don't hesitate to call.    Sincerely,             Catarina Metz MD        CC: No Recipients

## 2024-12-06 NOTE — TELEPHONE ENCOUNTER
I have tried calling mother but couldn't leave VM,   I called pt and have informed her that a letter has been sent to her My chart regarding mother call from earlier and pt was thankful for the call and had no other questions.

## 2024-12-16 ENCOUNTER — TELEPHONE (OUTPATIENT)
Age: 19
End: 2024-12-16

## 2024-12-16 NOTE — TELEPHONE ENCOUNTER
Mother calling to drop off letter for Dr. Metz to fill out for secondary insurance.  Will drop off at [facility] office today.

## 2024-12-24 NOTE — TELEPHONE ENCOUNTER
Called the patient's mother and informed her insurance forms are completed and can be picked up. As per pt's mother Kassie, will be off from work on Friday 12/27/24 and will  paperwork.

## 2025-01-20 ENCOUNTER — TELEPHONE (OUTPATIENT)
Dept: GASTROENTEROLOGY | Facility: CLINIC | Age: 20
End: 2025-01-20

## 2025-01-20 NOTE — TELEPHONE ENCOUNTER
1/20 neeraj Monterroso OBI submitted on [facility]        ARCADIO RODRIGUEZ (Key: [id number])  PA Case ID #: [id number]

## 2025-01-22 ENCOUNTER — TELEPHONE (OUTPATIENT)
Dept: FAMILY MEDICINE CLINIC | Facility: MEDICAL CENTER | Age: 20
End: 2025-01-22

## 2025-01-22 ENCOUNTER — TELEPHONE (OUTPATIENT)
Age: 20
End: 2025-01-22

## 2025-01-22 NOTE — TELEPHONE ENCOUNTER
Spoke to pt's mother and explained that I would send provider a message and see what the provider would like to do regarding the pt's request.

## 2025-01-22 NOTE — TELEPHONE ENCOUNTER
Pt's mother called regarding letter from [facility] and whether or not you would be able to help her with medication. Mother is requesting a call back from you since pt can not answer her phone during the day due to being in class.

## 2025-01-22 NOTE — TELEPHONE ENCOUNTER
Called patient in follow-up to letter received from her psychologist.  Patient endorses external stressor.  Anxiety and depression symptoms starting to affect personal relationships.  No suicidal ideation.  Patient interested in starting SSRI. Discussed common adverse side effects SSRIs sexual dysfunction, nausea, GI upset, dizziness, insomnia, headache, weight gain. Adverse effects typically resolve within the first week. Abrupt discontinuation may cause withdrawal symptoms ( i.e. dizziness, nausea, headache, paresthesia).  Will start patient on Lexapro 5 mg p.o. daily.  Patient will provide update in 4 to 6 weeks with how she is doing on medication.  She will continue to meet with therapist.  All questions answered.      PHQ-9 Depression Screening    Little interest or pleasure in doing things: 2 - more than half the days  Feeling down, depressed, or hopeless: 2 - more than half the days  Trouble falling or staying asleep, or sleeping too much: 2 - more than half the days  Feeling tired or having little energy: 3 - nearly every day  Poor appetite or overeatin - several days  Feeling bad about yourself - or that you are a failure or have let yourself or your family down: 3 - nearly every day  Trouble concentrating on things, such as reading the newspaper or watching television: 3 - nearly every day  Moving or speaking so slowly that other people could have noticed. Or the opposite - being so fidgety or restless that you have been moving around a lot more than usual: 0 - not at all  Thoughts that you would be better off dead, or of hurting yourself in some way: 0 - not at all  PHQ-9 Score: 16  Score Interpretation: Moderately severe depression             [id number]  Last Filed Value  Over the last two weeks, how often have you been bothered by the following problems?      Feeling nervous, anxious, or on edgeMore than half the daysMore than half the daysNot being able to stop or control worryingNearly every  dayNearly every dayWorrying too much about different thingsNearly every dayNearly every dayTrouble relaxingNearly every dayNearly every dayBeing so restless that it's hard to sit stillNearly every dayNearly every dayBecoming easily annoyed or irritableNearly every dayNearly every dayFeeling afraid as if something awful might happenMore than half the daysMore than half the daysHow difficult have these problems made it for you to do your work, take care of things at home, or get along with other people?Very difficultVery difficultGAD [id number]

## 2025-01-22 NOTE — TELEPHONE ENCOUNTER
Medication: SKYRIZI 360 OBI  Directions:  Q 56 DAYS  Quantity:1  Day Supply:56  Insurance: [employer]  How Prior Auth was submitted:JADE  Authorization Date range: 1/20/25-1/20/26  Authorization Number: LETTER IN MEDIA  Pharmacy that fills med: [facility]  Patient aware of approval:Y  Patient aware of pharmacy information:Y

## 2025-01-22 NOTE — TELEPHONE ENCOUNTER
Patient's mother Kassie called in to follow up on out of state communication and provider request to schedule VV or OV with patient in May 2025.  Warm transfer to Karol in office successfule.

## 2025-04-08 DIAGNOSIS — F32.A ANXIETY AND DEPRESSION: ICD-10-CM

## 2025-04-08 DIAGNOSIS — F41.9 ANXIETY AND DEPRESSION: ICD-10-CM

## 2025-04-08 NOTE — TELEPHONE ENCOUNTER
Medication: escitalopram (LEXAPRO) 5 mg tablet     Dose/Frequency: Take 1 tablet (5 mg total) by mouth daily     Quantity:  90 tablet  Refills: 0 ordered    Pharmacy:   [facility]/pharmacy #[id number] - [place], [place] - [address] Phone: [phone]   Fax: [phone]          Office:   [x] PCP/Provider -   [] Speciality/Provider -     Does the patient have enough for 3 days?   [] Yes   [x] No - Send as HP to POD

## 2025-04-09 RX ORDER — ESCITALOPRAM OXALATE 5 MG/1
5 TABLET ORAL DAILY
Qty: 90 TABLET | Refills: 0 | Status: SHIPPED | OUTPATIENT
Start: 2025-04-09

## 2025-04-09 NOTE — TELEPHONE ENCOUNTER
Refill must be reviewed and completed by the office or provider. The refill is unable to be approved or denied by the medication management team.    Out of state

## 2025-05-15 ENCOUNTER — OFFICE VISIT (OUTPATIENT)
Dept: FAMILY MEDICINE CLINIC | Facility: MEDICAL CENTER | Age: 20
End: 2025-05-15
Payer: COMMERCIAL

## 2025-05-15 VITALS
HEIGHT: 67 IN | HEART RATE: 72 BPM | BODY MASS INDEX: 17.08 KG/M2 | DIASTOLIC BLOOD PRESSURE: 74 MMHG | WEIGHT: 108.8 LBS | SYSTOLIC BLOOD PRESSURE: 102 MMHG | RESPIRATION RATE: 16 BRPM | OXYGEN SATURATION: 99 % | TEMPERATURE: 98.7 F

## 2025-05-15 DIAGNOSIS — F43.23 ADJUSTMENT REACTION WITH ANXIETY AND DEPRESSION: Primary | ICD-10-CM

## 2025-05-15 PROCEDURE — 99213 OFFICE O/P EST LOW 20 MIN: CPT | Performed by: STUDENT IN AN ORGANIZED HEALTH CARE EDUCATION/TRAINING PROGRAM

## 2025-05-15 RX ORDER — HYOSCYAMINE SULFATE 0.12 MG/1
TABLET SUBLINGUAL
COMMUNITY
Start: 2025-05-05

## 2025-05-15 NOTE — PROGRESS NOTES
":  Assessment & Plan  Adjustment reaction with anxiety and depression  Doing well with Lexapro at current dose, continue Lexapro 5 mg p.o. daily  Established with counseling services  Return in about 6 months when physical due and sooner as needed           History of Present Illness     Harry Corley is a 19 y.o. female who presents for medication monitoring encounter.  Patient has observed improvement since starting Lexapro.  She is still established with a therapist.  Her therapist has noticed a difference in her processing as well.    Review of Systems    As noted in HPI   Objective   /74 (BP Location: Left arm, Patient Position: Sitting, Cuff Size: Large)   Pulse 72   Temp 98.7 °F (37.1 °C) (Temporal)   Resp 16   Ht 5' 7\" (1.702 m)   Wt 49.4 kg (108 lb 12.8 oz)   SpO2 99%   BMI 17.04 kg/m²      Physical Exam  Vitals reviewed.   Constitutional:       General: She is not in acute distress.     Appearance: Normal appearance.   HENT:      Head: Normocephalic and atraumatic.     Eyes:      Conjunctiva/sclera: Conjunctivae normal.       Cardiovascular:      Rate and Rhythm: Normal rate and regular rhythm.      Pulses: Normal pulses.      Heart sounds: Normal heart sounds.   Pulmonary:      Effort: Pulmonary effort is normal.      Breath sounds: Normal breath sounds.   Abdominal:      General: Abdomen is flat.      Palpations: Abdomen is soft.      Tenderness: There is no abdominal tenderness.     Musculoskeletal:      Right lower leg: No edema.      Left lower leg: No edema.     Skin:     General: Skin is warm and dry.     Neurological:      Mental Status: She is alert and oriented to person, place, and time.     Psychiatric:         Mood and Affect: Mood normal.         Behavior: Behavior normal.         Thought Content: Thought content normal.         Judgment: Judgment normal.           "

## 2025-05-20 ENCOUNTER — TELEPHONE (OUTPATIENT)
Age: 20
End: 2025-05-20

## 2025-05-20 NOTE — TELEPHONE ENCOUNTER
Insurance [facility] Mak, Ashvin, RN calling to following up to make sure patient had apts and if anything that insurance should be aware of.    I did stated patient was just seen on 05/15/2025 and has a scheduled physical.  No other information was given.    Ashvin RN number is [phone]

## 2025-05-29 ENCOUNTER — ANNUAL EXAM (OUTPATIENT)
Dept: OBGYN CLINIC | Facility: CLINIC | Age: 20
End: 2025-05-29

## 2025-05-29 ENCOUNTER — TELEPHONE (OUTPATIENT)
Age: 20
End: 2025-05-29

## 2025-05-29 VITALS
WEIGHT: 108.4 LBS | SYSTOLIC BLOOD PRESSURE: 110 MMHG | HEIGHT: 67 IN | BODY MASS INDEX: 17.01 KG/M2 | DIASTOLIC BLOOD PRESSURE: 62 MMHG

## 2025-05-29 DIAGNOSIS — Z01.411 ENCOUNTER FOR GYNECOLOGICAL EXAMINATION WITH ABNORMAL FINDING: Primary | ICD-10-CM

## 2025-05-29 DIAGNOSIS — N94.10 DYSPAREUNIA IN FEMALE: ICD-10-CM

## 2025-05-29 DIAGNOSIS — Z30.9 ENCOUNTER FOR CONTRACEPTIVE MANAGEMENT, UNSPECIFIED TYPE: ICD-10-CM

## 2025-05-29 PROCEDURE — G0145 SCR C/V CYTO,THINLAYER,RESCR: HCPCS | Performed by: PHYSICIAN ASSISTANT

## 2025-05-29 RX ORDER — NORELGESTROMIN AND ETHINYL ESTRADIOL 35; 150 UG/MG; UG/MG
1 PATCH TRANSDERMAL WEEKLY
Qty: 3 PATCH | Refills: 3 | Status: SHIPPED | OUTPATIENT
Start: 2025-05-29

## 2025-05-29 NOTE — TELEPHONE ENCOUNTER
PA for Ortho Evra 150-35mcg/24HR SUBMITTED to [employer]    via    []CMM-KEY:   [x]Surescripts-Case ID # [id number]   []Availity-Auth ID # NDC #  []Faxed to plan   []Other website   []Phone call Case ID #    [x]PA sent as URGENT    All office notes, labs and other pertaining documents and studies sent. Clinical questions answered. Awaiting determination from insurance company.     Turnaround time for your insurance to make a decision on your Prior Authorization can take 7-21 business days.

## 2025-05-29 NOTE — PATIENT INSTRUCTIONS
Follow up with PT as planned.    Rx for birth control patch sent to pharmacy.  Place first patch first day of next period.  Instructions for use given.    Call with problems.

## 2025-05-29 NOTE — PROGRESS NOTES
Name: Harry Corley      : 2005      MRN: [id number]  Encounter Provider: Jes Yao PA-C  Encounter Date: 2025   Encounter department: [facility]  :  Assessment & Plan  Encounter for gynecological examination with abnormal finding    Orders:    Liquid-based pap, screening    Dyspareunia in female    Orders:    Ambulatory Referral to Physical Therapy; Future    Encounter for contraceptive management, unspecified type    Orders:    norelgestromin-ethinyl estradiol (ORTHO EVRA) 150-35 MCG/24HR; Place 1 patch on the skin once a week over 7 days    Pap done.  Referral to PT placed.  Rx for birth control patch sent to pharmacy.  Place first patch first day of next period.  Instructions for use given.  F/u in 3 mos for recheck.  Call with problems in the interim.    History of Present Illness   Patient is here for yearly gyn exam.  Seen with her mother present.  States she is doing well overall.  Periods are regular once a month, and bleeding lasts for 7 days.  Experiencing mood swings.  Denies heavy bleeding, severe cramping, and HA.  Would like to start birth control patch as her boyfriend is coming for the summer.  They have been using condoms.  Patient completed some pelvic floor PT, but still has pain with intercourse. Would like a new referral.  Followed by GI for Crohn's disease, which is currently stable.  Denies bladder changes, pelvic pain, and thyroid disease.    Patient is performing self-breast exam.  Denies new masses, skin changes, nipple discharge, and pain/tenderness.      Harry Corley is a 19 y.o. female who presents for yearly gyn exam.  History obtained from: patient    Review of Systems   Constitutional:  Negative for appetite change and unexpected weight change.   Cardiovascular:         No masses, skin changes, nipple discharge, and pain/tenderness.   Gastrointestinal:  Negative for abdominal distention, abdominal pain, constipation, diarrhea,  "nausea and vomiting.   Genitourinary:  Positive for dyspareunia. Negative for difficulty urinating, dysuria, frequency, genital sores, hematuria, menstrual problem, pelvic pain, urgency, vaginal bleeding, vaginal discharge and vaginal pain.          Objective   /62 (BP Location: Left arm, Patient Position: Sitting, Cuff Size: Adult)   Ht 5' 7\" (1.702 m)   Wt 49.2 kg (108 lb 6.4 oz)   LMP 05/23/2025 (Exact Date)   BMI 16.98 kg/m²      Physical Exam  Vitals reviewed. Exam conducted with a chaperone present.   Constitutional:       Appearance: Normal appearance. She is well-developed and normal weight.   Neck:      Thyroid: No thyromegaly.   Pulmonary:      Effort: Pulmonary effort is normal.   Chest:   Breasts:     Breasts are symmetrical.      Right: Normal. No swelling, bleeding, inverted nipple, mass, nipple discharge, skin change or tenderness.      Left: Normal. No swelling, bleeding, inverted nipple, mass, nipple discharge, skin change or tenderness.   Abdominal:      General: There is no distension.      Palpations: Abdomen is soft.      Tenderness: There is no abdominal tenderness.   Genitourinary:     General: Normal vulva.      Pubic Area: No rash.       Labia:         Right: No rash, tenderness, lesion or injury.         Left: No rash, tenderness, lesion or injury.       Vagina: Normal. No vaginal discharge, erythema, tenderness or bleeding.      Cervix: Normal.      Uterus: Normal.       Adnexa: Right adnexa normal and left adnexa normal.        Right: No mass, tenderness or fullness.          Left: No mass, tenderness or fullness.       Musculoskeletal:      Cervical back: Neck supple.   Lymphadenopathy:      Cervical: No cervical adenopathy.      Upper Body:      Right upper body: No supraclavicular or axillary adenopathy.      Left upper body: No supraclavicular or axillary adenopathy.      Lower Body: No right inguinal adenopathy. No left inguinal adenopathy.     Skin:     General: Skin is " warm and dry.     Neurological:      Mental Status: She is alert and oriented to person, place, and time.     Psychiatric:         Behavior: Behavior normal. Behavior is cooperative.         Thought Content: Thought content normal.         Judgment: Judgment normal.

## 2025-05-30 NOTE — TELEPHONE ENCOUNTER
PA for Ortho Evra 150-35 mcg/24 HR  DENIED    Reason:(Screenshot if applicable)        Message sent to office clinical pool Yes    Denial letter scanned into Media Yes    We can gladly do an appeal but the process can take about 30-60 days to provide determination. Please have the office staff schedule a Peer to Peer at phone [phone] . If an appeal is truly warranted please have Provider send clinical documentation to the PA department to support the appeal.     **Please follow up with your patient regarding denial and next steps**

## 2025-06-04 LAB
LAB AP GYN PRIMARY INTERPRETATION: NORMAL
Lab: NORMAL

## 2025-07-10 ENCOUNTER — NURSE TRIAGE (OUTPATIENT)
Age: 20
End: 2025-07-10

## 2025-07-10 NOTE — TELEPHONE ENCOUNTER
"REASON FOR CONVERSATION: Contraception    SYMPTOMS: vaginal bleeding,     OTHER HEALTH INFORMATION: Pt calling in saying she on the ortho evra patch, pt saying that she started it on 6/22/25, pt c/o having vaginal spotting and cramping at times 3/10 pain, and lower back pain 3/10 aching pain. Pts mom on phone call (pt gave consent), asking when the medication peaks with the estrogen and progesterone, pts mom stating that she will look at the insert within the package to review the information.     PROTOCOL DISPOSITION: Home Care    CARE ADVICE PROVIDED: Pt is provided care advice, and is notified when to call back, pt verbalized understanding.    PRACTICE FOLLOW-UP:   N/a       Reason for Disposition   Irregular or unexpected bleeding or spotting    Answer Assessment - Initial Assessment Questions  1. TYPE: \"What type of patch do you have?\"       Ortho evra   2. START DATE: \"When did you first start using the patch?\"      6/22/25   3. SYMPTOM: \"What is the main symptom (or question) you're concerned about?\"       vb  4. ONSET: \"When did the  symptoms  start?\"      2 weeks ago   5. VAGINAL BLEEDING:  \"Are you having any unusual vaginal bleeding?\"       Yes, spotting bright red bleeding.   6. ABDOMEN OR PELVIC PAIN: \"Are you having any pain in your abdomen or pelvic area?\" (Scale: 0, 1-10; none, mild, moderate, severe)      3/10 pelvic pain cramping, 3/10 aching lower back pain   7. PREGNANCY: \"Are you concerned that you might be pregnant?\" \"When was your last menstrual period?\"      5/23/25, pt denies pregnancy    Protocols used: Contraception - Transdermal Patch-Adult-AH    "

## 2025-08-08 DIAGNOSIS — K50.012 CROHN'S DISEASE OF SMALL INTESTINE WITH INTESTINAL OBSTRUCTION (HCC): ICD-10-CM

## 2025-08-08 RX ORDER — RISANKIZUMAB-RZAA 360 MG/2.4
WEARABLE INJECTOR SUBCUTANEOUS
Qty: 2.4 ML | Refills: 5 | Status: SHIPPED | OUTPATIENT
Start: 2025-08-08